# Patient Record
Sex: MALE | Race: ASIAN | NOT HISPANIC OR LATINO | Employment: OTHER | URBAN - METROPOLITAN AREA
[De-identification: names, ages, dates, MRNs, and addresses within clinical notes are randomized per-mention and may not be internally consistent; named-entity substitution may affect disease eponyms.]

---

## 2017-05-15 ENCOUNTER — OFFICE VISIT (OUTPATIENT)
Dept: FAMILY MEDICINE | Facility: CLINIC | Age: 77
End: 2017-05-15
Payer: MEDICARE

## 2017-05-15 VITALS
OXYGEN SATURATION: 96 % | HEIGHT: 67 IN | WEIGHT: 95.88 LBS | SYSTOLIC BLOOD PRESSURE: 110 MMHG | BODY MASS INDEX: 15.05 KG/M2 | HEART RATE: 88 BPM | RESPIRATION RATE: 18 BRPM | DIASTOLIC BLOOD PRESSURE: 64 MMHG

## 2017-05-15 DIAGNOSIS — J47.9 ADULT BRONCHIECTASIS: Primary | ICD-10-CM

## 2017-05-15 DIAGNOSIS — E78.2 ELEVATED TRIGLYCERIDES WITH HIGH CHOLESTEROL: ICD-10-CM

## 2017-05-15 DIAGNOSIS — E03.9 HYPOTHYROIDISM, UNSPECIFIED TYPE: ICD-10-CM

## 2017-05-15 DIAGNOSIS — R63.4 WEIGHT LOSS, NON-INTENTIONAL: ICD-10-CM

## 2017-05-15 PROCEDURE — 1126F AMNT PAIN NOTED NONE PRSNT: CPT | Mod: S$GLB,,, | Performed by: FAMILY MEDICINE

## 2017-05-15 PROCEDURE — 99214 OFFICE O/P EST MOD 30 MIN: CPT | Mod: S$GLB,,, | Performed by: FAMILY MEDICINE

## 2017-05-15 PROCEDURE — 99999 PR PBB SHADOW E&M-EST. PATIENT-LVL III: CPT | Mod: PBBFAC,,, | Performed by: FAMILY MEDICINE

## 2017-05-15 PROCEDURE — 99499 UNLISTED E&M SERVICE: CPT | Mod: S$GLB,,, | Performed by: FAMILY MEDICINE

## 2017-05-15 PROCEDURE — 1159F MED LIST DOCD IN RCRD: CPT | Mod: S$GLB,,, | Performed by: FAMILY MEDICINE

## 2017-05-15 PROCEDURE — 1160F RVW MEDS BY RX/DR IN RCRD: CPT | Mod: S$GLB,,, | Performed by: FAMILY MEDICINE

## 2017-05-15 NOTE — MR AVS SNAPSHOT
[facility]  [address] [facility] [address]  [place] [address]  Phone: [phone]  Fax: [phone]                  Leonarda Almazan   5/15/2017 3:20 PM   Office Visit    Description:  Male : 1940   Provider:  Heriberto Morris MD   Department:  [facility]           Reason for Visit     Follow-up           Diagnoses this Visit        Comments    Adult bronchiectasis    -  Primary     Weight loss, non-intentional         Hypothyroidism, unspecified type         Elevated triglycerides with high cholesterol                To Do List           Future Appointments        Provider Department Dept Phone    2017 8:45 AM LAB, [place] [facility] [phone]    2017 2:00 PM Danilo Summers MD [facility] [phone]    6/15/2017 10:00 AM LAB, [place] [facility] [phone]      Goals (5 Years of Data)     None      [facility]     [facility] Nurse Care Line -  Assistance  Unless otherwise directed by your provider, please contact [facility], our nurse care line that is available for  assistance.     Registered nurses in the [facility] provide: appointment scheduling, clinical advisement, health education, and other advisory services.  Call: [phone] (toll free)               Medications           Message regarding Medications     Verify the changes and/or additions to your medication regime listed below are the same as discussed with your clinician today.  If any of these changes or additions are incorrect, please notify your healthcare provider.             Verify that the below list of medications is an accurate representation of the medications you are currently taking.  If none reported, the list may be blank. If incorrect, please contact your healthcare provider. Carry this list with you in case of emergency.           Current Medications     aluminum  "hydrox-magnesium carb (GAVISCON)  mg/15 mL Susp Take 30 mLs by mouth every evening. 2 tablespoons at bedtime.    levothyroxine (SYNTHROID) 75 MCG tablet Take 1 tablet (75 mcg total) by mouth before breakfast.    mirtazapine (REMERON) 15 MG tablet Take 1 tablet (15 mg total) by mouth every evening.    ranitidine (ZANTAC) 150 MG tablet Take 1 tablet (150 mg total) by mouth 2 (two) times daily.           Clinical Reference Information           Your Vitals Were     BP Pulse Resp Height Weight SpO2    110/64 (BP Location: Left arm, Patient Position: Sitting, BP Method: Manual) 88 18 5' 7" (1.702 m) 43.5 kg (95 lb 14.4 oz) 96%    BMI                15.02 kg/m2          Blood Pressure          Most Recent Value    BP  110/64      Allergies as of 5/15/2017     No Known Allergies      Immunizations Administered on Date of Encounter - 5/15/2017     None      Orders Placed During Today's Visit      Normal Orders This Visit    Ambulatory referral to Pulmonology     Future Labs/Procedures Expected by Expires    CBC auto differential  5/15/2017 5/16/2018    Comprehensive metabolic panel  5/15/2017 5/16/2018    Lipid panel  5/15/2017 5/16/2018    PREALBUMIN  5/15/2017 7/14/2018    TSH  5/15/2017 5/16/2018      Language Assistance Services     ATTENTION: Language assistance services are available, free of charge. Please call [phone].      ATENCIÓN: Si habla español, tiene a spencer disposición servicios gratuitos de asistencia lingüística. Llame al [phone].     CHÚ Ý: N?u b?n nói Ti?ng Vi?t, có các d?ch v? h? tr? ngôn ng? mi?n phí dành cho b?n. G?i s? [phone].         [facility] complies with applicable Federal civil rights laws and does not discriminate on the basis of race, color, national origin, age, disability, or sex.        "

## 2017-05-15 NOTE — PROGRESS NOTES
"Subjective:     THIS DOCUMENT WAS MADE IN PART WITH [employer] DICTATION SOFTWARE. OCCASIONALLY THIS SOFTWARE MAY MISINTERPRET WORDS OR PHRASES.     Patient ID: Leonarda Almzaan is a 76 y.o. male.    Chief Complaint: Follow-up (underweight/fatigue)    HPI    this is a 76-year-old male, Retired , with a history of bronchiectasis, chronic low body mass, with recent weight loss.     Today he is concerned about a few things. He's been very tired, decreased energy. He denies depression. He is sleeping well. He denies any shortness of breath and denies any chest pain.     He is concerned about his weight and recent weight loss. He feels his appetite is good and is eating at least three meals a day. He thinks this is probably multifactorial and probably related to his lung disease.     He has chronic lung disease, chronic abnormal chest imaging. With bronchiectasis. Currently following with Dr. Valentin,  But states he is concerned that no treatment has been offered and states that he keeps being told to repeat a CT scan every six months. The note his last CT scan was more than a year ago.    He does have chronic mucus production, sometimes "black" never read not yellow or green. He has a chronic cough, four years it had been dry but now is sometimes productive as stated     hypothyroidism, chronic condition, this has been stable on Synthroid 75 MCG daily.     He has had some difficulty with insomnia and I placed him on mirtazapine but this was just as much for an appetite stimulant if not more so. But he states this really didn't help him sleep but made him feel groggy the following morning so he has not been taking it. and I stated he doesn't think there is a current problem with his appetite]    Active Ambulatory Problems     Diagnosis Date Noted    Chronic pain 04/11/2012    GERD (gastroesophageal reflux disease)     Lung nodules     COPD (chronic obstructive pulmonary disease)     Anemia 02/19/2013    " "Hypothyroidism 09/30/2013    Bronchiectasis 12/07/2014    Chronic airway obstruction, not elsewhere classified 08/02/2011    Obstructive chronic bronchitis with acute bronchitis 12/26/2014    Dermatophytosis of nail 03/21/2010    Herpes zoster without mention of complication 08/02/2011    Orthostatic hypotension 10/26/2010    Herpes zoster with other specified complications 11/14/2011    Malnutrition of moderate degree 12/26/2014    Pancytopenia 12/26/2014    Tricuspid valve disorders, specified as nonrheumatic 10/14/2010    Cardiac dysrhythmia, unspecified 10/14/2010    Pterygium, unspecified 12/28/2009    Vitreous degeneration 12/28/2009    Pterygium of eye 05/13/2015    Weight loss 05/28/2015     Resolved Ambulatory Problems     Diagnosis Date Noted    No Resolved Ambulatory Problems     Past Medical History:   Diagnosis Date    Anemia 2/19/2013    Bronchiectasis without acute exacerbation     Colon polyp     Cough     Elevated PSA     GERD (gastroesophageal reflux disease)     Herpes zoster w/ nervous system complication 2012    Hypothyroidism     Lung nodules     Post herpetic neuralgia          Review of Systems   Constitutional: Positive for fatigue and unexpected weight change. Negative for appetite change, chills and fever.   HENT: Positive for congestion.    Respiratory: Positive for cough. Negative for shortness of breath.    Cardiovascular: Negative for chest pain.   Gastrointestinal: Negative for abdominal pain, blood in stool, constipation, diarrhea and nausea.   Endocrine: Negative for polydipsia and polyuria.   Genitourinary: Negative for dysuria and hematuria.   Musculoskeletal: Negative.        Objective:       Vitals:    05/15/17 1516   BP: 110/64   BP Location: Left arm   Patient Position: Sitting   BP Method: Manual   Pulse: 88   Resp: 18   SpO2: 96%   Weight: 43.5 kg (95 lb 14.4 oz)   Height: 5' 7" (1.702 m)       Physical Exam   Constitutional: He is oriented to " person, place, and time. He appears well-developed and well-nourished. No distress.   HENT:   Head: Normocephalic and atraumatic.   Eyes: No scleral icterus.   Neck: Normal range of motion. Neck supple.   Cardiovascular: Normal rate, regular rhythm and normal heart sounds.  Exam reveals no gallop.    No murmur heard.  Pulmonary/Chest: Effort normal. No respiratory distress.   Mildly diminished breath sounds bilaterally  Scattered coarse breath sounds   Neurological: He is alert and oriented to person, place, and time.   Skin: Skin is warm and dry. He is not diaphoretic.   Psychiatric: He has a normal mood and affect. His behavior is normal.   Vitals reviewed.      Assessment:       1. Adult bronchiectasis    2. Weight loss, non-intentional    3. Hypothyroidism, unspecified type    4. Elevated triglycerides with high cholesterol        Plan:       Leonarda was seen today for follow-up.    Diagnoses and all orders for this visit:    Adult bronchiectasis  -     Ambulatory referral to Pulmonology   he like a second opinion so will request consultation at [facility].   Though I did discuss the condition with him and advised him that really isn't any specific regular treatment for bronchiectasis. We should monitor and watch for infection. If he has reactive airway symptoms or shortness of breath and then we could consider an anticholinergic or even an inhaled corticosteroid but he's not having the symptoms now. But he also has other maladies on the CT scan that require regular monitoring and surveillance by pulmonology.    Weight loss, non-intentional  -     Comprehensive metabolic panel; Future  -     TSH; Future  -     Lipid panel; Future  -     CBC auto differential; Future  -     PREALBUMIN; Future   I did advise him that even though he feels his appetite is normal, he needs to increase calorie and the protein intake, at minimum 70 g protein daily for maintenance but may need to increase this by 25 - 35% initially  to catch up.    Hypothyroidism, unspecified type  -     TSH; Future    Elevated triglycerides with high cholesterol  -     Lipid panel; Future

## 2017-05-16 ENCOUNTER — LAB VISIT (OUTPATIENT)
Dept: LAB | Facility: HOSPITAL | Age: 77
End: 2017-05-16
Attending: FAMILY MEDICINE
Payer: MEDICARE

## 2017-05-16 DIAGNOSIS — E03.9 HYPOTHYROIDISM, UNSPECIFIED TYPE: ICD-10-CM

## 2017-05-16 DIAGNOSIS — E78.2 ELEVATED TRIGLYCERIDES WITH HIGH CHOLESTEROL: ICD-10-CM

## 2017-05-16 DIAGNOSIS — R63.4 WEIGHT LOSS, NON-INTENTIONAL: ICD-10-CM

## 2017-05-16 LAB
ALBUMIN SERPL BCP-MCNC: 3.2 G/DL
ALP SERPL-CCNC: 93 U/L
ALT SERPL W/O P-5'-P-CCNC: 11 U/L
ANION GAP SERPL CALC-SCNC: 7 MMOL/L
AST SERPL-CCNC: 19 U/L
BASOPHILS # BLD AUTO: 0.02 K/UL
BASOPHILS NFR BLD: 0.3 %
BILIRUB SERPL-MCNC: 0.6 MG/DL
BUN SERPL-MCNC: 21 MG/DL
CALCIUM SERPL-MCNC: 9.3 MG/DL
CHLORIDE SERPL-SCNC: 102 MMOL/L
CHOLEST/HDLC SERPL: 3.8 {RATIO}
CO2 SERPL-SCNC: 29 MMOL/L
CREAT SERPL-MCNC: 0.8 MG/DL
DIFFERENTIAL METHOD: ABNORMAL
EOSINOPHIL # BLD AUTO: 0.2 K/UL
EOSINOPHIL NFR BLD: 3.1 %
ERYTHROCYTE [DISTWIDTH] IN BLOOD BY AUTOMATED COUNT: 14 %
EST. GFR  (AFRICAN AMERICAN): >60 ML/MIN/1.73 M^2
EST. GFR  (NON AFRICAN AMERICAN): >60 ML/MIN/1.73 M^2
GLUCOSE SERPL-MCNC: 90 MG/DL
HCT VFR BLD AUTO: 35.3 %
HDL/CHOLESTEROL RATIO: 26.3 %
HDLC SERPL-MCNC: 175 MG/DL
HDLC SERPL-MCNC: 46 MG/DL
HGB BLD-MCNC: 11.4 G/DL
LDLC SERPL CALC-MCNC: 112.6 MG/DL
LYMPHOCYTES # BLD AUTO: 1.7 K/UL
LYMPHOCYTES NFR BLD: 22.4 %
MCH RBC QN AUTO: 30.6 PG
MCHC RBC AUTO-ENTMCNC: 32.3 %
MCV RBC AUTO: 95 FL
MONOCYTES # BLD AUTO: 0.5 K/UL
MONOCYTES NFR BLD: 6.9 %
NEUTROPHILS # BLD AUTO: 4.9 K/UL
NEUTROPHILS NFR BLD: 67.2 %
NONHDLC SERPL-MCNC: 129 MG/DL
PLATELET # BLD AUTO: 342 K/UL
PMV BLD AUTO: 9.2 FL
POTASSIUM SERPL-SCNC: 4.5 MMOL/L
PREALB SERPL-MCNC: 14 MG/DL
PROT SERPL-MCNC: 8 G/DL
RBC # BLD AUTO: 3.73 M/UL
SODIUM SERPL-SCNC: 138 MMOL/L
TRIGL SERPL-MCNC: 82 MG/DL
TSH SERPL DL<=0.005 MIU/L-ACNC: 3.31 UIU/ML
WBC # BLD AUTO: 7.36 K/UL

## 2017-05-16 PROCEDURE — 84443 ASSAY THYROID STIM HORMONE: CPT

## 2017-05-16 PROCEDURE — 80053 COMPREHEN METABOLIC PANEL: CPT

## 2017-05-16 PROCEDURE — 85025 COMPLETE CBC W/AUTO DIFF WBC: CPT

## 2017-05-16 PROCEDURE — 36415 COLL VENOUS BLD VENIPUNCTURE: CPT | Mod: PO

## 2017-05-16 PROCEDURE — 84134 ASSAY OF PREALBUMIN: CPT

## 2017-05-16 PROCEDURE — 80061 LIPID PANEL: CPT

## 2017-06-07 ENCOUNTER — HOSPITAL ENCOUNTER (OUTPATIENT)
Dept: PULMONOLOGY | Facility: CLINIC | Age: 77
Discharge: HOME | End: 2017-06-07
Payer: MEDICARE

## 2017-06-07 ENCOUNTER — OFFICE VISIT (OUTPATIENT)
Dept: PULMONOLOGY | Facility: CLINIC | Age: 77
End: 2017-06-07
Payer: MEDICARE

## 2017-06-07 VITALS
WEIGHT: 95 LBS | OXYGEN SATURATION: 96 % | HEART RATE: 85 BPM | SYSTOLIC BLOOD PRESSURE: 98 MMHG | DIASTOLIC BLOOD PRESSURE: 66 MMHG | BODY MASS INDEX: 14.4 KG/M2 | HEIGHT: 68 IN

## 2017-06-07 DIAGNOSIS — J44.9 CHRONIC OBSTRUCTIVE PULMONARY DISEASE, UNSPECIFIED COPD TYPE: ICD-10-CM

## 2017-06-07 DIAGNOSIS — A31.9 ATYPICAL MYCOBACTERIAL DISEASE: ICD-10-CM

## 2017-06-07 DIAGNOSIS — A31.0 MAI (MYCOBACTERIUM AVIUM-INTRACELLULARE): Primary | ICD-10-CM

## 2017-06-07 DIAGNOSIS — J47.9 BRONCHIECTASIS, NON-TUBERCULOUS: Primary | ICD-10-CM

## 2017-06-07 DIAGNOSIS — J44.9 CHRONIC OBSTRUCTIVE PULMONARY DISEASE, UNSPECIFIED COPD TYPE: Primary | ICD-10-CM

## 2017-06-07 LAB
PRE FEV1 FVC: 94
PRE FEV1: 2.28
PRE FVC: 2.42
PREDICTED FEV1 FVC: 78
PREDICTED FEV1: 2.71
PREDICTED FVC: 3.46

## 2017-06-07 PROCEDURE — 99204 OFFICE O/P NEW MOD 45 MIN: CPT | Mod: S$GLB,,, | Performed by: INTERNAL MEDICINE

## 2017-06-07 PROCEDURE — 99999 PR PBB SHADOW E&M-EST. PATIENT-LVL III: CPT | Mod: PBBFAC,,, | Performed by: INTERNAL MEDICINE

## 2017-06-07 PROCEDURE — 1159F MED LIST DOCD IN RCRD: CPT | Mod: S$GLB,,, | Performed by: INTERNAL MEDICINE

## 2017-06-07 PROCEDURE — 1126F AMNT PAIN NOTED NONE PRSNT: CPT | Mod: S$GLB,,, | Performed by: INTERNAL MEDICINE

## 2017-06-07 PROCEDURE — 99499 UNLISTED E&M SERVICE: CPT | Mod: S$GLB,,, | Performed by: INTERNAL MEDICINE

## 2017-06-07 PROCEDURE — 94010 BREATHING CAPACITY TEST: CPT | Mod: S$GLB,,, | Performed by: INTERNAL MEDICINE

## 2017-06-07 NOTE — PROGRESS NOTES
Subjective:       Patient ID: Leonarda Almazan is a 76 y.o. male.    Chief Complaint: Abnormal Chest X-ray and Bronchiectasis    HPI  77 yo retired  comes for assessment of an abnormal CT and hx of bronchiectasis. Originally from [place], and emigrated to the [place] in [place]. Attended college in [employer] [facility] and worked at an  there for 20 years and later [employer] in [place]. He is extremely thin, looks like a POW or a TB patient from a sanatorium before antibiotics. He is a non smoker. Tires easily and has infrequent bouts of bronchitis. No hx of hemoptysis or nigh sweats.  I reviewed a series of 4 CT and he has bilateral tree and  Bud changes with bronchiectasis which has slowly progressed from 2014. No significant  Cavitary lesions. Labs done last month are normal except for a slightly low H&H 11.7 and 35.7  Slightly low albumin. He  Had two major abdominal surgeries can't gain weight.  Review of Systems   Constitutional: Positive for fatigue and weakness.   HENT: Negative.    Eyes: Negative.    Respiratory: Negative.         Abnormal CT   Cardiovascular: Negative.    Genitourinary: Negative.    Musculoskeletal: Negative.    Skin: Negative.    Gastrointestinal: Negative.         Two major abdominal surgeries. Appendical abscess   Psychiatric/Behavioral: Negative.        Objective:      Physical Exam   Constitutional: He is oriented to person, place, and time. He appears well-developed and well-nourished.   Truly cachectic Weighs 95#'s   HENT:   Head: Normocephalic and atraumatic.   Right Ear: External ear normal.   Left Ear: External ear normal.   Eyes: Conjunctivae and EOM are normal. Pupils are equal, round, and reactive to light.   Neck: Normal range of motion. Neck supple.   Cardiovascular: Normal rate, regular rhythm and normal heart sounds.    Pulmonary/Chest: Effort normal and breath sounds normal.   Clear without wheezes  Resting Sa02; 93% and  It gerri to 96% when he walked.     FVC down slightly:  2.422.28lites ;oters 69% and FEV-1; Normal 98%   Abdominal: Soft. Bowel sounds are normal.    Schapoid with surgery scars.   Musculoskeletal: Normal range of motion.   Neurological: He is alert and oriented to person, place, and time. He has normal reflexes.   Skin: Skin is warm and dry.   Psychiatric: He has a normal mood and affect. His behavior is normal. Judgment and thought content normal.         Assessment:       No diagnosis found.    Outpatient Encounter Prescriptions as of 6/7/2017   Medication Sig Dispense Refill    aluminum hydrox-magnesium carb (GAVISCON)  mg/15 mL Susp Take 30 mLs by mouth every evening. 2 tablespoons at bedtime.  0    levothyroxine (SYNTHROID) 75 MCG tablet Take 1 tablet (75 mcg total) by mouth before breakfast. 90 tablet 3    mirtazapine (REMERON) 15 MG tablet Take 1 tablet (15 mg total) by mouth every evening. 90 tablet 1    ranitidine (ZANTAC) 150 MG tablet Take 1 tablet (150 mg total) by mouth 2 (two) times daily. 180 tablet 3     No facility-administered encounter medications on file as of 6/7/2017.      No orders of the defined types were placed in this encounter.    Plan:             IMP Chronic atypical mycobacterial infection,,very indolent but progressive, since CT of 2014. Labs Normal.  Getting ESR and will have a CT done in [facility] and review and contact the patient. I doubt that medical therapy is warranted. He doesn't eat now, if he takes 2 or three drugs he will starve to death. CT done in [place] I have reviewed and it shows progressive disease.  Will discuss with the patient about the rational or two drug therapy.  I was on vacation when CT done and have now reviewed the CT and will call patient.

## 2017-06-14 ENCOUNTER — HOSPITAL ENCOUNTER (OUTPATIENT)
Dept: RADIOLOGY | Facility: HOSPITAL | Age: 77
Discharge: HOME | End: 2017-06-14
Attending: INTERNAL MEDICINE
Payer: MEDICARE

## 2017-06-14 DIAGNOSIS — A31.0 MAI (MYCOBACTERIUM AVIUM-INTRACELLULARE): ICD-10-CM

## 2017-06-14 PROCEDURE — 71250 CT THORAX DX C-: CPT | Mod: TC,PO

## 2017-06-14 PROCEDURE — 71250 CT THORAX DX C-: CPT | Mod: 26,,, | Performed by: RADIOLOGY

## 2017-08-08 ENCOUNTER — PATIENT MESSAGE (OUTPATIENT)
Dept: PULMONOLOGY | Facility: CLINIC | Age: 77
End: 2017-08-08

## 2017-08-14 ENCOUNTER — OFFICE VISIT (OUTPATIENT)
Dept: FAMILY MEDICINE | Facility: CLINIC | Age: 77
End: 2017-08-14
Payer: MEDICARE

## 2017-08-14 ENCOUNTER — PATIENT MESSAGE (OUTPATIENT)
Dept: PULMONOLOGY | Facility: CLINIC | Age: 77
End: 2017-08-14

## 2017-08-14 VITALS
BODY MASS INDEX: 15.42 KG/M2 | WEIGHT: 101.44 LBS | HEART RATE: 76 BPM | SYSTOLIC BLOOD PRESSURE: 92 MMHG | DIASTOLIC BLOOD PRESSURE: 57 MMHG

## 2017-08-14 DIAGNOSIS — R63.4 WEIGHT LOSS: ICD-10-CM

## 2017-08-14 DIAGNOSIS — E03.9 HYPOTHYROIDISM, UNSPECIFIED TYPE: ICD-10-CM

## 2017-08-14 DIAGNOSIS — E44.0 MALNUTRITION OF MODERATE DEGREE: ICD-10-CM

## 2017-08-14 DIAGNOSIS — J44.9 CHRONIC OBSTRUCTIVE PULMONARY DISEASE, UNSPECIFIED COPD TYPE: ICD-10-CM

## 2017-08-14 DIAGNOSIS — I95.1 ORTHOSTATIC HYPOTENSION: ICD-10-CM

## 2017-08-14 DIAGNOSIS — Z00.00 ENCOUNTER FOR PREVENTIVE HEALTH EXAMINATION: Primary | ICD-10-CM

## 2017-08-14 DIAGNOSIS — I36.9 TRICUSPID VALVE DISORDERS, SPECIFIED AS NONRHEUMATIC: ICD-10-CM

## 2017-08-14 DIAGNOSIS — I70.0 AORTIC ATHEROSCLEROSIS: ICD-10-CM

## 2017-08-14 DIAGNOSIS — J84.10 PULMONARY FIBROSIS: ICD-10-CM

## 2017-08-14 DIAGNOSIS — A31.0 MYCOBACTERIUM AVIUM INFECTION: ICD-10-CM

## 2017-08-14 DIAGNOSIS — K21.9 GASTROESOPHAGEAL REFLUX DISEASE, ESOPHAGITIS PRESENCE NOT SPECIFIED: ICD-10-CM

## 2017-08-14 DIAGNOSIS — D64.9 ANEMIA, UNSPECIFIED TYPE: ICD-10-CM

## 2017-08-14 DIAGNOSIS — R91.8 LUNG NODULES: ICD-10-CM

## 2017-08-14 DIAGNOSIS — H43.819 VITREOUS DEGENERATION, UNSPECIFIED LATERALITY: ICD-10-CM

## 2017-08-14 DIAGNOSIS — J47.9 BRONCHIECTASIS WITHOUT COMPLICATION: ICD-10-CM

## 2017-08-14 PROCEDURE — 99999 PR PBB SHADOW E&M-EST. PATIENT-LVL III: CPT | Mod: PBBFAC,,, | Performed by: NURSE PRACTITIONER

## 2017-08-14 PROCEDURE — G0439 PPPS, SUBSEQ VISIT: HCPCS | Mod: S$GLB,,, | Performed by: NURSE PRACTITIONER

## 2017-08-14 PROCEDURE — 99499 UNLISTED E&M SERVICE: CPT | Mod: S$GLB,,, | Performed by: NURSE PRACTITIONER

## 2017-08-15 PROBLEM — I70.0 AORTIC ATHEROSCLEROSIS: Status: ACTIVE | Noted: 2017-08-15

## 2017-08-15 PROBLEM — J84.10 PULMONARY FIBROSIS: Status: ACTIVE | Noted: 2017-08-15

## 2017-08-15 NOTE — PROGRESS NOTES
Leonarda Almazan presented for a  Medicare AWV and comprehensive Health Risk Assessment today. The following components were reviewed and updated:    · Medical history  · Family History  · Social history  · Allergies and Current Medications  · Health Risk Assessment  · Health Maintenance  · Care Team     ** See Completed Assessments for Annual Wellness Visit within the encounter summary.**       The following assessments were completed:  · Living Situation  · CAGE  · Depression Screening  · Timed Get Up and Go  · Whisper Test  · Cognitive Function Screening  · Nutrition Screening  · ADL Screening  · PAQ Screening    Vitals:    08/14/17 1514   BP: (!) 92/57   Pulse: 76   Weight: 46 kg (101 lb 6.6 oz)     Body mass index is 15.42 kg/m².  Physical Exam   Constitutional: He is oriented to person, place, and time. He appears well-developed. No distress.   cachetic   HENT:   Head: Normocephalic and atraumatic.   Eyes: No scleral icterus.   Neck: Normal range of motion. Neck supple.   Cardiovascular: Normal rate, regular rhythm and normal heart sounds.  Exam reveals no gallop.    No murmur heard.  Pulmonary/Chest: Effort normal. No respiratory distress. He has decreased breath sounds in the right lower field and the left lower field.   Mildly diminished breath sounds bilaterally   Neurological: He is alert and oriented to person, place, and time.   Skin: Skin is warm and dry. He is not diaphoretic.   Psychiatric: He has a normal mood and affect. His behavior is normal.   Vitals reviewed.        Diagnoses and health risks identified today and associated recommendations/orders:    1. Encounter for preventive health examination  Recommend yearly HRA visit    2. Mycobacterium avium infection    Continue to follow pulm recs  Schedule follow up with pulmonary  Followed by [facility].       3. Malnutrition of moderate degree  Stable.   Maintaining weight  Lengthy discussion regarding increasing food intake  Is willing to add bananas and  peanut butter to diet  Followed by Heriberto Morris MD .       4. Bronchiectasis without complication  Stable.     Followed by [facility].       5. Chronic obstructive pulmonary disease, unspecified COPD type  Unchanged  Followed by Pulmonary.     6.  Pulmonary fibrosis  Stable.     Followed by [facility].    CT chest 6/14/17    7.  Aortic atherosclerosis  Stable.   Continue to monitor lipids  Followed by Heriberto Morris MD .    CT Chest 4/25/16    6. Orthostatic hypotension  Stable.   Continue to drink adequate amount of h20  Followed by Heriberto Morris MD .       7. Anemia, unspecified type    Continue to monitor   Followed by Heriberto Morris MD .       8. Weight loss  maintaining weight  Encourage increase caloric intake, specifically protein  Followed by Heriberto Morris MD .       9. Gastroesophageal reflux disease, esophagitis presence not specified  Stable.   Taking zantac  Followed by Heriberto Morris MD .       10. Lung nodules  Continue surveillance  Followed by [facility].       11. Hypothyroidism, unspecified type  Stable.   Taking levothyroxine, Continue to monitor tsh  Followed by Heriberto Morris MD .       12. Tricuspid valve disorders, specified as nonrheumatic  Stable.     Followed by Heriberto Morris MD .       13. Vitreous degeneration, unspecified laterality  Stable.     Followed by [facility].         Provided Ju with a 5-10 year written screening schedule and personal prevention plan. Recommendations were developed using the USPSTF age appropriate recommendations. Education, counseling, and referrals were provided as needed. After Visit Summary printed and given to patient which includes a list of additional screenings\tests needed.    Return in about 1 year (around 8/14/2018).    Sophie Hurt NP

## 2017-08-18 ENCOUNTER — TELEPHONE (OUTPATIENT)
Dept: PULMONOLOGY | Facility: CLINIC | Age: 77
End: 2017-08-18

## 2017-08-18 DIAGNOSIS — A31.0 PULMONARY MYCOBACTERIUM AVIUM COMPLEX (MAC) INFECTION: Primary | ICD-10-CM

## 2017-08-18 RX ORDER — CIPROFLOXACIN 500 MG/1
500 TABLET ORAL 2 TIMES DAILY
Qty: 60 TABLET | Refills: 6 | Status: SHIPPED | OUTPATIENT
Start: 2017-08-18 | End: 2017-11-22 | Stop reason: SDUPTHER

## 2017-08-18 RX ORDER — AZITHROMYCIN 500 MG/1
TABLET, FILM COATED ORAL
Qty: 15 TABLET | Refills: 6 | Status: SHIPPED | OUTPATIENT
Start: 2017-08-18 | End: 2018-05-03 | Stop reason: ALTCHOICE

## 2017-08-18 NOTE — TELEPHONE ENCOUNTER
Patient has bronchiectasis and has slow progression of MAC on CT. Will give an empirical trial of cipro 500 mg BID and azithormycin 500 my qod. He has had gastric surgery in the past and has a long hx of digestive problems. Will check in weekly to see if he can tolerate the antibiotics.

## 2017-08-27 ENCOUNTER — PATIENT MESSAGE (OUTPATIENT)
Dept: PULMONOLOGY | Facility: CLINIC | Age: 77
End: 2017-08-27

## 2017-09-03 ENCOUNTER — PATIENT MESSAGE (OUTPATIENT)
Dept: PULMONOLOGY | Facility: CLINIC | Age: 77
End: 2017-09-03

## 2017-09-16 ENCOUNTER — PATIENT MESSAGE (OUTPATIENT)
Dept: PULMONOLOGY | Facility: CLINIC | Age: 77
End: 2017-09-16

## 2017-10-01 ENCOUNTER — PATIENT MESSAGE (OUTPATIENT)
Dept: PULMONOLOGY | Facility: CLINIC | Age: 77
End: 2017-10-01

## 2017-10-06 ENCOUNTER — PATIENT MESSAGE (OUTPATIENT)
Dept: PULMONOLOGY | Facility: CLINIC | Age: 77
End: 2017-10-06

## 2017-10-12 ENCOUNTER — HOSPITAL ENCOUNTER (OUTPATIENT)
Dept: RADIOLOGY | Facility: HOSPITAL | Age: 77
Discharge: HOME | End: 2017-10-12
Attending: INTERNAL MEDICINE
Payer: MEDICARE

## 2017-10-12 DIAGNOSIS — J44.9 CHRONIC OBSTRUCTIVE PULMONARY DISEASE, UNSPECIFIED COPD TYPE: ICD-10-CM

## 2017-10-12 PROCEDURE — 71020 XR CHEST PA AND LATERAL: CPT | Mod: 26,,, | Performed by: RADIOLOGY

## 2017-10-12 PROCEDURE — 71020 XR CHEST PA AND LATERAL: CPT | Mod: TC,PO

## 2017-10-15 ENCOUNTER — PATIENT MESSAGE (OUTPATIENT)
Dept: PULMONOLOGY | Facility: CLINIC | Age: 77
End: 2017-10-15

## 2017-10-27 ENCOUNTER — PATIENT MESSAGE (OUTPATIENT)
Dept: PULMONOLOGY | Facility: CLINIC | Age: 77
End: 2017-10-27

## 2017-10-30 DIAGNOSIS — A31.0 MYCOBACTERIUM AVIUM COMPLEX: Primary | ICD-10-CM

## 2017-11-13 ENCOUNTER — PATIENT MESSAGE (OUTPATIENT)
Dept: PULMONOLOGY | Facility: CLINIC | Age: 77
End: 2017-11-13

## 2017-11-20 ENCOUNTER — TELEPHONE (OUTPATIENT)
Dept: FAMILY MEDICINE | Facility: CLINIC | Age: 77
End: 2017-11-20

## 2017-11-20 ENCOUNTER — PATIENT MESSAGE (OUTPATIENT)
Dept: PULMONOLOGY | Facility: CLINIC | Age: 77
End: 2017-11-20

## 2017-11-20 ENCOUNTER — TELEPHONE (OUTPATIENT)
Dept: PULMONOLOGY | Facility: CLINIC | Age: 77
End: 2017-11-20

## 2017-11-20 NOTE — TELEPHONE ENCOUNTER
----- Message from Darnell Jackson sent at 11/20/2017  9:58 AM CST -----  Contact: [facility]- [phone]  Patient returning the nurse phone call. Thanks!

## 2017-11-22 ENCOUNTER — OFFICE VISIT (OUTPATIENT)
Dept: FAMILY MEDICINE | Facility: CLINIC | Age: 77
End: 2017-11-22
Payer: MEDICARE

## 2017-11-22 VITALS
BODY MASS INDEX: 16.17 KG/M2 | HEIGHT: 68 IN | RESPIRATION RATE: 16 BRPM | DIASTOLIC BLOOD PRESSURE: 64 MMHG | HEART RATE: 72 BPM | SYSTOLIC BLOOD PRESSURE: 96 MMHG | WEIGHT: 106.69 LBS

## 2017-11-22 DIAGNOSIS — Z00.00 WELLNESS EXAMINATION: Primary | ICD-10-CM

## 2017-11-22 DIAGNOSIS — K21.9 GASTROESOPHAGEAL REFLUX DISEASE, ESOPHAGITIS PRESENCE NOT SPECIFIED: ICD-10-CM

## 2017-11-22 DIAGNOSIS — A31.0 PULMONARY MYCOBACTERIUM AVIUM COMPLEX (MAC) INFECTION: ICD-10-CM

## 2017-11-22 DIAGNOSIS — Z23 NEED FOR PNEUMOCOCCAL VACCINE: ICD-10-CM

## 2017-11-22 DIAGNOSIS — E03.9 HYPOTHYROIDISM, UNSPECIFIED TYPE: ICD-10-CM

## 2017-11-22 PROCEDURE — G0009 ADMIN PNEUMOCOCCAL VACCINE: HCPCS | Mod: S$GLB,,, | Performed by: FAMILY MEDICINE

## 2017-11-22 PROCEDURE — G0008 ADMIN INFLUENZA VIRUS VAC: HCPCS | Mod: S$GLB,,, | Performed by: FAMILY MEDICINE

## 2017-11-22 PROCEDURE — 99999 PR PBB SHADOW E&M-EST. PATIENT-LVL III: CPT | Mod: PBBFAC,,, | Performed by: FAMILY MEDICINE

## 2017-11-22 PROCEDURE — 90662 IIV NO PRSV INCREASED AG IM: CPT | Mod: S$GLB,,, | Performed by: FAMILY MEDICINE

## 2017-11-22 PROCEDURE — 99499 UNLISTED E&M SERVICE: CPT | Mod: S$GLB,,, | Performed by: FAMILY MEDICINE

## 2017-11-22 PROCEDURE — 90670 PCV13 VACCINE IM: CPT | Mod: S$GLB,,, | Performed by: FAMILY MEDICINE

## 2017-11-22 PROCEDURE — 99397 PER PM REEVAL EST PAT 65+ YR: CPT | Mod: S$GLB,,, | Performed by: FAMILY MEDICINE

## 2017-11-22 RX ORDER — CIPROFLOXACIN 500 MG/1
500 TABLET ORAL 2 TIMES DAILY
Qty: 180 TABLET | Refills: 0 | Status: SHIPPED | OUTPATIENT
Start: 2017-11-22 | End: 2018-05-03 | Stop reason: ALTCHOICE

## 2017-11-22 RX ORDER — MULTIVITAMIN
1 TABLET ORAL DAILY
Status: ON HOLD | COMMUNITY
End: 2022-01-01 | Stop reason: HOSPADM

## 2017-11-22 RX ORDER — LEVOTHYROXINE SODIUM 75 UG/1
75 TABLET ORAL
Qty: 90 TABLET | Refills: 3 | Status: SHIPPED | OUTPATIENT
Start: 2017-11-22 | End: 2019-01-14 | Stop reason: SDUPTHER

## 2017-11-22 NOTE — PROGRESS NOTES
Subjective:       Patient ID: Leonarda Almazan is a 77 y.o. male.    Chief Complaint: Establish Care (Patient here to establish care )    Here to establish care; saw Dr. Morris previously.  States doing well overall.  Due for wellness.  Sees pulmonary still and on month 3/6 of abx for FILI.      Review of Systems   Constitutional: Negative for activity change, chills, fever and unexpected weight change.   HENT: Negative for hearing loss, rhinorrhea and trouble swallowing.    Eyes: Negative for discharge and visual disturbance.   Respiratory: Negative for cough, chest tightness, shortness of breath and wheezing.    Cardiovascular: Negative for chest pain, palpitations and leg swelling.   Gastrointestinal: Negative for blood in stool, constipation, diarrhea and vomiting.   Endocrine: Negative for cold intolerance, heat intolerance, polydipsia and polyuria.   Genitourinary: Negative for difficulty urinating, hematuria and urgency.   Musculoskeletal: Negative for arthralgias, joint swelling and neck pain.   Skin: Negative for rash.   Neurological: Negative for weakness and headaches.   Psychiatric/Behavioral: Negative for confusion and dysphoric mood.       Objective:      Physical Exam   Constitutional: He appears well-developed and well-nourished.   HENT:   Head: Normocephalic and atraumatic.   Cardiovascular: Normal rate, regular rhythm and normal heart sounds.    Pulmonary/Chest: Effort normal and breath sounds normal.   Abdominal: Soft. Bowel sounds are normal.   Psychiatric: He has a normal mood and affect.   Nursing note and vitals reviewed.      Assessment:       1. Wellness examination    2. Hypothyroidism, unspecified type    3. Gastroesophageal reflux disease, esophagitis presence not specified    4. Pulmonary Mycobacterium avium complex (MAC) infection    5. Need for pneumococcal vaccine        Plan:       Wellness examination    Hypothyroidism, unspecified type  -     levothyroxine (SYNTHROID) 75 MCG tablet;  Take 1 tablet (75 mcg total) by mouth before breakfast.  Dispense: 90 tablet; Refill: 3  -     CBC auto differential; Future; Expected date: 11/22/2017  -     Comprehensive metabolic panel; Future; Expected date: 11/22/2017  -     Lipid panel; Future; Expected date: 11/22/2017  -     TSH; Future; Expected date: 11/22/2017    Gastroesophageal reflux disease, esophagitis presence not specified  -     CBC auto differential; Future; Expected date: 11/22/2017  -     Comprehensive metabolic panel; Future; Expected date: 11/22/2017  -     Lipid panel; Future; Expected date: 11/22/2017  -     TSH; Future; Expected date: 11/22/2017    Pulmonary Mycobacterium avium complex (MAC) infection  -     ciprofloxacin HCl (CIPRO) 500 MG tablet; Take 1 tablet (500 mg total) by mouth 2 (two) times daily.  Dispense: 180 tablet; Refill: 0    Need for pneumococcal vaccine  -     (In Office Administered) Pneumococcal Conjugate Vaccine (13 Valent) (IM)      Weight up since last visit so hopefully as FILI is treated he will gain more but will monitor.  Update labs and health maintenance.  Will monitor chronic medical issues and continue current plan of care.    Return in about 6 months (around 5/22/2018), or if symptoms worsen or fail to improve.

## 2017-11-28 ENCOUNTER — HOSPITAL ENCOUNTER (OUTPATIENT)
Dept: RADIOLOGY | Facility: HOSPITAL | Age: 77
Discharge: HOME | End: 2017-11-28
Attending: INTERNAL MEDICINE
Payer: MEDICARE

## 2017-11-28 ENCOUNTER — PATIENT MESSAGE (OUTPATIENT)
Dept: PULMONOLOGY | Facility: CLINIC | Age: 77
End: 2017-11-28

## 2017-11-28 DIAGNOSIS — A31.0 MYCOBACTERIUM AVIUM COMPLEX: ICD-10-CM

## 2017-11-28 PROCEDURE — 71020 XR CHEST PA AND LATERAL: CPT | Mod: 26,,, | Performed by: RADIOLOGY

## 2017-11-28 PROCEDURE — 71020 XR CHEST PA AND LATERAL: CPT | Mod: TC,PO

## 2017-12-09 ENCOUNTER — PATIENT MESSAGE (OUTPATIENT)
Dept: PULMONOLOGY | Facility: CLINIC | Age: 77
End: 2017-12-09

## 2017-12-24 ENCOUNTER — PATIENT MESSAGE (OUTPATIENT)
Dept: PULMONOLOGY | Facility: CLINIC | Age: 77
End: 2017-12-24

## 2018-01-06 ENCOUNTER — PATIENT MESSAGE (OUTPATIENT)
Dept: PULMONOLOGY | Facility: CLINIC | Age: 78
End: 2018-01-06

## 2018-01-21 ENCOUNTER — PATIENT MESSAGE (OUTPATIENT)
Dept: PULMONOLOGY | Facility: CLINIC | Age: 78
End: 2018-01-21

## 2018-02-03 ENCOUNTER — PATIENT MESSAGE (OUTPATIENT)
Dept: PULMONOLOGY | Facility: CLINIC | Age: 78
End: 2018-02-03

## 2018-02-17 ENCOUNTER — PATIENT MESSAGE (OUTPATIENT)
Dept: PULMONOLOGY | Facility: CLINIC | Age: 78
End: 2018-02-17

## 2018-04-10 ENCOUNTER — TELEPHONE (OUTPATIENT)
Dept: FAMILY MEDICINE | Facility: CLINIC | Age: 78
End: 2018-04-10

## 2018-04-11 ENCOUNTER — TELEPHONE (OUTPATIENT)
Dept: FAMILY MEDICINE | Facility: CLINIC | Age: 78
End: 2018-04-11

## 2018-04-11 NOTE — TELEPHONE ENCOUNTER
"Offered [facility] health risk assessment appt for 2018 and pt declined. Mr. Almazan states, "this do not help me . I do not want it".  "

## 2018-04-23 ENCOUNTER — PATIENT MESSAGE (OUTPATIENT)
Dept: PULMONOLOGY | Facility: CLINIC | Age: 78
End: 2018-04-23

## 2018-04-23 ENCOUNTER — TELEPHONE (OUTPATIENT)
Dept: PULMONOLOGY | Facility: CLINIC | Age: 78
End: 2018-04-23

## 2018-04-23 DIAGNOSIS — Q04.5: Primary | ICD-10-CM

## 2018-04-24 ENCOUNTER — HOSPITAL ENCOUNTER (OUTPATIENT)
Dept: RADIOLOGY | Facility: HOSPITAL | Age: 78
Discharge: HOME | End: 2018-04-24
Attending: INTERNAL MEDICINE
Payer: MEDICARE

## 2018-04-24 ENCOUNTER — TELEPHONE (OUTPATIENT)
Dept: FAMILY MEDICINE | Facility: CLINIC | Age: 78
End: 2018-04-24

## 2018-04-24 DIAGNOSIS — D53.9 NUTRITIONAL ANEMIA: Primary | ICD-10-CM

## 2018-04-24 DIAGNOSIS — Q04.5: ICD-10-CM

## 2018-04-24 PROCEDURE — 71046 X-RAY EXAM CHEST 2 VIEWS: CPT | Mod: 26,,, | Performed by: RADIOLOGY

## 2018-04-24 PROCEDURE — 71046 X-RAY EXAM CHEST 2 VIEWS: CPT | Mod: TC,FY,PO

## 2018-04-24 NOTE — TELEPHONE ENCOUNTER
----- Message from Cat Francis sent at 4/24/2018 12:06 PM CDT -----  Type: Needs Medical Advice    Who Called:  self  Symptoms (please be specific):  anemia  How long has patient had these symptoms:  Asking for lab orders Thursday or friday  Pharmacy name and phone  Best Call Back Number: [phone]  Additional Information:

## 2018-04-25 ENCOUNTER — LAB VISIT (OUTPATIENT)
Dept: LAB | Facility: HOSPITAL | Age: 78
End: 2018-04-25
Attending: FAMILY MEDICINE
Payer: MEDICARE

## 2018-04-25 DIAGNOSIS — D53.9 NUTRITIONAL ANEMIA: ICD-10-CM

## 2018-04-25 LAB
FERRITIN SERPL-MCNC: 113 NG/ML
FOLATE SERPL-MCNC: 15.4 NG/ML
IRON SERPL-MCNC: 66 UG/DL
SATURATED IRON: 19 %
TOTAL IRON BINDING CAPACITY: 346 UG/DL
TRANSFERRIN SERPL-MCNC: 234 MG/DL
VIT B12 SERPL-MCNC: 780 PG/ML

## 2018-04-25 PROCEDURE — 83540 ASSAY OF IRON: CPT

## 2018-04-25 PROCEDURE — 82746 ASSAY OF FOLIC ACID SERUM: CPT

## 2018-04-25 PROCEDURE — 82728 ASSAY OF FERRITIN: CPT

## 2018-04-25 PROCEDURE — 82607 VITAMIN B-12: CPT

## 2018-04-25 PROCEDURE — 36415 COLL VENOUS BLD VENIPUNCTURE: CPT | Mod: PO

## 2018-04-28 ENCOUNTER — PATIENT MESSAGE (OUTPATIENT)
Dept: PULMONOLOGY | Facility: CLINIC | Age: 78
End: 2018-04-28

## 2018-05-03 ENCOUNTER — PATIENT MESSAGE (OUTPATIENT)
Dept: FAMILY MEDICINE | Facility: CLINIC | Age: 78
End: 2018-05-03

## 2018-05-03 ENCOUNTER — PATIENT MESSAGE (OUTPATIENT)
Dept: PULMONOLOGY | Facility: CLINIC | Age: 78
End: 2018-05-03

## 2018-05-03 ENCOUNTER — OFFICE VISIT (OUTPATIENT)
Dept: FAMILY MEDICINE | Facility: CLINIC | Age: 78
End: 2018-05-03
Payer: MEDICARE

## 2018-05-03 VITALS
DIASTOLIC BLOOD PRESSURE: 56 MMHG | SYSTOLIC BLOOD PRESSURE: 96 MMHG | HEART RATE: 68 BPM | OXYGEN SATURATION: 95 % | WEIGHT: 110.25 LBS | BODY MASS INDEX: 16.71 KG/M2 | TEMPERATURE: 98 F | HEIGHT: 68 IN

## 2018-05-03 DIAGNOSIS — J44.9 CHRONIC OBSTRUCTIVE PULMONARY DISEASE, UNSPECIFIED COPD TYPE: ICD-10-CM

## 2018-05-03 DIAGNOSIS — Q04.5: Primary | ICD-10-CM

## 2018-05-03 DIAGNOSIS — A31.0 MYCOBACTERIUM AVIUM INFECTION: ICD-10-CM

## 2018-05-03 DIAGNOSIS — I70.0 AORTIC ATHEROSCLEROSIS: ICD-10-CM

## 2018-05-03 DIAGNOSIS — E44.0 MALNUTRITION OF MODERATE DEGREE: Primary | ICD-10-CM

## 2018-05-03 DIAGNOSIS — J84.10 PULMONARY FIBROSIS: ICD-10-CM

## 2018-05-03 DIAGNOSIS — J47.9 BRONCHIECTASIS WITHOUT COMPLICATION: ICD-10-CM

## 2018-05-03 PROCEDURE — 99499 UNLISTED E&M SERVICE: CPT | Mod: S$PBB,,, | Performed by: FAMILY MEDICINE

## 2018-05-03 PROCEDURE — 99214 OFFICE O/P EST MOD 30 MIN: CPT | Mod: S$GLB,,, | Performed by: FAMILY MEDICINE

## 2018-05-03 PROCEDURE — 99999 PR PBB SHADOW E&M-EST. PATIENT-LVL III: CPT | Mod: PBBFAC,,, | Performed by: FAMILY MEDICINE

## 2018-05-03 RX ORDER — ZOSTER VACCINE RECOMBINANT, ADJUVANTED 50 MCG/0.5
KIT INTRAMUSCULAR
COMMUNITY
Start: 2018-04-15 | End: 2018-05-03

## 2018-05-03 NOTE — PROGRESS NOTES
Subjective:       Patient ID: Leonarda Almazan is a 77 y.o. male.    Chief Complaint: Cough; Fatigue; Dizziness; and under weight    Here for f/u chronic medical issues. Finished abx treatment for MAC.  Feels better.  Has gained 4 pounds.  Follows with pulmonary.        Cough   This is a chronic problem. The current episode started more than 1 month ago. The problem has been unchanged. The cough is non-productive. Associated symptoms include rhinorrhea and weight loss. Pertinent negatives include no chills, heartburn, hemoptysis, myalgias, nasal congestion, postnasal drip, rash, sore throat, shortness of breath, sweats or wheezing. The symptoms are aggravated by cold air. Risk factors for lung disease include occupational exposure and smoking/tobacco exposure. He has tried nothing for the symptoms. The treatment provided no relief. His past medical history is significant for bronchiectasis, COPD and pneumonia.   Fatigue   Associated symptoms include coughing and fatigue. Pertinent negatives include no chills, myalgias, rash or sore throat.     Review of Systems   Constitutional: Positive for fatigue and weight loss. Negative for chills.   HENT: Positive for rhinorrhea. Negative for postnasal drip and sore throat.    Respiratory: Positive for cough. Negative for hemoptysis, shortness of breath and wheezing.    Gastrointestinal: Negative for heartburn.   Musculoskeletal: Negative for myalgias.   Skin: Negative for rash.       Objective:      Physical Exam   Constitutional: He appears well-developed and well-nourished.   HENT:   Head: Normocephalic and atraumatic.   Cardiovascular: Normal rate, regular rhythm and normal heart sounds.    Pulmonary/Chest: Effort normal and breath sounds normal.   Psychiatric: He has a normal mood and affect.   Nursing note and vitals reviewed.      Assessment:       1. Malnutrition of moderate degree    2. Chronic obstructive pulmonary disease, unspecified COPD type    3. Pulmonary fibrosis     4. Bronchiectasis without complication    5. Mycobacterium avium infection    6. Aortic atherosclerosis        Plan:       Malnutrition of moderate degree  -     CBC auto differential; Future; Expected date: 05/03/2018  -     Comprehensive metabolic panel; Future; Expected date: 05/03/2018  -     Lipid panel; Future; Expected date: 05/03/2018  -     TSH; Future; Expected date: 05/03/2018    Chronic obstructive pulmonary disease, unspecified COPD type    Pulmonary fibrosis    Bronchiectasis without complication    Mycobacterium avium infection  -     CBC auto differential; Future; Expected date: 05/03/2018  -     Comprehensive metabolic panel; Future; Expected date: 05/03/2018  -     Lipid panel; Future; Expected date: 05/03/2018  -     TSH; Future; Expected date: 05/03/2018    Aortic atherosclerosis  -     CBC auto differential; Future; Expected date: 05/03/2018  -     Comprehensive metabolic panel; Future; Expected date: 05/03/2018  -     Lipid panel; Future; Expected date: 05/03/2018  -     TSH; Future; Expected date: 05/03/2018    f/u with pulmonary as planned to discuss possible continuing abx; inhaler?  Will monitor chronic medical issues and continue current plan of care.    Follow-up in about 3 months (around 8/3/2018), or if symptoms worsen or fail to improve.

## 2018-05-04 ENCOUNTER — PATIENT MESSAGE (OUTPATIENT)
Dept: FAMILY MEDICINE | Facility: CLINIC | Age: 78
End: 2018-05-04

## 2018-05-07 ENCOUNTER — PATIENT MESSAGE (OUTPATIENT)
Dept: FAMILY MEDICINE | Facility: CLINIC | Age: 78
End: 2018-05-07

## 2018-05-14 ENCOUNTER — PATIENT MESSAGE (OUTPATIENT)
Dept: PULMONOLOGY | Facility: CLINIC | Age: 78
End: 2018-05-14

## 2018-05-16 DIAGNOSIS — A31.0 MAI (MYCOBACTERIUM AVIUM-INTRACELLULARE): Primary | ICD-10-CM

## 2018-05-16 RX ORDER — CIPROFLOXACIN 500 MG/1
500 TABLET ORAL 2 TIMES DAILY
Qty: 60 TABLET | Refills: 12 | Status: SHIPPED | OUTPATIENT
Start: 2018-05-16 | End: 2018-09-04 | Stop reason: ALTCHOICE

## 2018-05-16 RX ORDER — AZITHROMYCIN 250 MG/1
250 TABLET, FILM COATED ORAL DAILY
Qty: 30 TABLET | Refills: 12 | Status: SHIPPED | OUTPATIENT
Start: 2018-05-16 | End: 2018-05-22

## 2018-07-23 ENCOUNTER — OFFICE VISIT (OUTPATIENT)
Dept: CARDIOLOGY | Facility: CLINIC | Age: 78
End: 2018-07-23
Payer: MEDICARE

## 2018-07-23 VITALS
WEIGHT: 103.19 LBS | SYSTOLIC BLOOD PRESSURE: 93 MMHG | BODY MASS INDEX: 15.64 KG/M2 | HEART RATE: 81 BPM | HEIGHT: 68 IN | DIASTOLIC BLOOD PRESSURE: 52 MMHG

## 2018-07-23 DIAGNOSIS — R55 NEAR SYNCOPE: ICD-10-CM

## 2018-07-23 DIAGNOSIS — R94.31 NONSPECIFIC ABNORMAL ELECTROCARDIOGRAM (ECG) (EKG): ICD-10-CM

## 2018-07-23 DIAGNOSIS — R07.89 CHEST HEAVINESS: Primary | ICD-10-CM

## 2018-07-23 DIAGNOSIS — I95.1 ORTHOSTATIC HYPOTENSION: ICD-10-CM

## 2018-07-23 DIAGNOSIS — R53.83 FATIGUE, UNSPECIFIED TYPE: ICD-10-CM

## 2018-07-23 DIAGNOSIS — R01.1 UNDIAGNOSED CARDIAC MURMURS: ICD-10-CM

## 2018-07-23 PROCEDURE — 99203 OFFICE O/P NEW LOW 30 MIN: CPT | Mod: S$GLB,,, | Performed by: INTERNAL MEDICINE

## 2018-07-23 PROCEDURE — 99999 PR PBB SHADOW E&M-EST. PATIENT-LVL III: CPT | Mod: PBBFAC,,, | Performed by: INTERNAL MEDICINE

## 2018-07-23 PROCEDURE — 93000 ELECTROCARDIOGRAM COMPLETE: CPT | Mod: S$GLB,,, | Performed by: INTERNAL MEDICINE

## 2018-07-23 RX ORDER — FLUDROCORTISONE ACETATE 0.1 MG/1
100 TABLET ORAL DAILY
Qty: 30 TABLET | Refills: 1 | Status: SHIPPED | OUTPATIENT
Start: 2018-07-23 | End: 2018-07-23 | Stop reason: SDUPTHER

## 2018-07-23 RX ORDER — FLUDROCORTISONE ACETATE 0.1 MG/1
100 TABLET ORAL DAILY
Qty: 90 TABLET | Refills: 0 | Status: SHIPPED | OUTPATIENT
Start: 2018-07-23 | End: 2018-08-22

## 2018-07-23 NOTE — PROGRESS NOTES
Subjective:    Patient ID:  Leonarda Almazan is a 78 y.o. male who presents for Fatigue (heaviness in chest for past year) and Chest Pain        Fatigue   This is a chronic problem. The current episode started more than 1 year ago. The problem occurs daily. The problem has been gradually worsening. Associated symptoms include coughing and fatigue. Pertinent negatives include no abdominal pain, change in bowel habit, chest pain (MILD HEAVINESS IN CHEST), chills, congestion, diaphoresis, fever, numbness, rash, vomiting or weakness.   Chest Pain    This is a recurrent problem. The problem occurs intermittently. The problem has been waxing and waning. The pain is present in the lateral region. The pain is at a severity of 2/10. The quality of the pain is described as heavy. Associated symptoms include a cough, malaise/fatigue, near-syncope (WITH LOW BP, IN THE SHOWER/HOT) and sputum production. Pertinent negatives include no abdominal pain, back pain, claudication, diaphoresis, dizziness (MILD ORTHOSTASIS), fever, hemoptysis, irregular heartbeat (OCC), numbness, orthopnea, palpitations, PND, shortness of breath, syncope, vomiting or weakness.     New patient evaluation, FATIGUE, ON ABX FOR > SX FOR  FILI, , WAS BETTER WHEN ON ABX, NOW MORE COUGH, ALSO REPORTS FATIGUE, LAST , CHEST DISCOMFORT BETTER WITH EXERCISE, WORSE WHEN DOES NOT EAT WELL, SEE ROS    Past Medical History:   Diagnosis Date    Anemia 2/19/2013    Bronchiectasis without acute exacerbation     Colon polyp     Cough     Elevated PSA     GERD (gastroesophageal reflux disease)     on herbal med    Herpes zoster w/ nervous system complication 2012    still has some pain, takes no meds    Hypothyroidism     Lung nodules     followed by Dr. Montoya    Post herpetic neuralgia      Past Surgical History:   Procedure Laterality Date    APPENDECTOMY      COLONOSCOPY  9/15/2008  [facility]    Two 1 mm polyps in the ascending colon.  HYPERPLASTIC POLYPS.       ESOPHAGOGASTRODUODENOSCOPY  10/16/2012  [facility]    Non-erosive esophageal reflux (NERD) disease???.   Gastric mucosal atrophy.  MILD CHRONIC GASTRITIS WITH INTESTINAL METAPLASIA.  Otherwise normal stomach and duodenum.  CLOtest negative.    EYE SURGERY      OS, not cataract, pt unsure of name    UPPER GASTROINTESTINAL ENDOSCOPY  9/15/2008  [facility]    Normal esophagus.   Symptoms probably due to NERD.  Slight gastric mucosal atrophy.   Normal examined duodenum.     Family History   Problem Relation Age of Onset    Diabetes Neg Hx     Glaucoma Neg Hx     Macular degeneration Neg Hx     Hypertension Neg Hx     Retinal detachment Neg Hx     Strabismus Neg Hx     Stroke Neg Hx     Thyroid disease Neg Hx     Cataracts Neg Hx     Cancer Neg Hx     Blindness Neg Hx     Amblyopia Neg Hx      Social History     Social History    Marital status:      Spouse name: N/A    Number of children: N/A    Years of education: N/A     Social History Main Topics    Smoking status: Never Smoker    Smokeless tobacco: Never Used    Alcohol use No    Drug use: No    Sexual activity: No     Other Topics Concern    None     Social History Narrative    None       Review of patient's allergies indicates:  No Known Allergies    Current Outpatient Prescriptions:     levothyroxine (SYNTHROID) 75 MCG tablet, Take 1 tablet (75 mcg total) by mouth before breakfast., Disp: 90 tablet, Rfl: 3    multivitamin (ONE DAILY MULTIVITAMIN) per tablet, Take 1 tablet by mouth once daily., Disp: , Rfl:     ranitidine (ZANTAC) 150 MG capsule, Take 150 mg by mouth nightly., Disp: , Rfl:     aluminum hydrox-magnesium carb (GAVISCON)  mg/15 mL Susp, Take 30 mLs by mouth every evening. 2 tablespoons at bedtime., Disp: , Rfl: 0    ciprofloxacin HCl (CIPRO) 500 MG tablet, Take 1 tablet (500 mg total) by mouth 2 (two) times daily., Disp: 60 tablet, Rfl: 12    fludrocortisone (FLORINEF) 0.1 mg Tab, Take 1 tablet (100 mcg total) by  "mouth once daily., Disp: 30 tablet, Rfl: 1    Review of Systems   Constitution: Positive for fatigue, malaise/fatigue and weight loss (OVER YEARS). Negative for chills, diaphoresis, fever, weakness and night sweats.   HENT: Negative for congestion and nosebleeds.    Eyes: Negative for blurred vision, double vision and visual disturbance.   Cardiovascular: Positive for near-syncope (WITH LOW BP, IN THE SHOWER/HOT). Negative for chest pain (MILD HEAVINESS IN CHEST), claudication, cyanosis, dyspnea on exertion (MILD), irregular heartbeat (OCC), leg swelling, orthopnea, palpitations, paroxysmal nocturnal dyspnea and syncope.   Respiratory: Positive for cough and sputum production. Negative for hemoptysis, shortness of breath and wheezing.    Endocrine: Negative for cold intolerance, heat intolerance and polyuria.   Hematologic/Lymphatic: Negative for adenopathy. Does not bruise/bleed easily.   Skin: Negative for color change, itching and rash.   Musculoskeletal: Negative for back pain, falls and joint pain.   Gastrointestinal: Negative for abdominal pain, change in bowel habit, dysphagia, flatus, heartburn, hematemesis, jaundice, melena and vomiting.   Genitourinary: Positive for nocturia. Negative for dysuria, flank pain, frequency and hematuria.   Neurological: Negative for brief paralysis, dizziness (MILD ORTHOSTASIS), focal weakness, light-headedness, loss of balance, numbness and tremors.   Psychiatric/Behavioral: Negative for altered mental status, depression and memory loss.   Allergic/Immunologic: Negative for hives and persistent infections.        Objective:      Vitals:    07/23/18 1358   BP: (!) 93/52   Pulse: 81   Weight: 46.8 kg (103 lb 2.8 oz)   Height: 5' 8" (1.727 m)   PainSc: 0-No pain     Body mass index is 15.69 kg/m².    Physical Exam   Constitutional: He is oriented to person, place, and time. He appears well-developed and well-nourished. He is active.   SKINNY   HENT:   Head: Normocephalic and " atraumatic.   Mouth/Throat: Oropharynx is clear and moist and mucous membranes are normal.   Eyes: Conjunctivae and EOM are normal. Pupils are equal, round, and reactive to light.   Neck: Normal range of motion. Neck supple. Normal carotid pulses, no hepatojugular reflux and no JVD present. Carotid bruit is not present. No tracheal deviation, no edema and no erythema present. No thyromegaly present.   Cardiovascular: Normal rate and regular rhythm.   No extrasystoles are present. PMI is not displaced.  Exam reveals no gallop, no distant heart sounds, no friction rub and no midsystolic click.    Murmur heard.   Systolic murmur is present with a grade of 1/6  at the lower left sternal border  Pulses:       Carotid pulses are 2+ on the right side, and 2+ on the left side.       Radial pulses are 2+ on the right side, and 2+ on the left side.        Femoral pulses are 2+ on the right side, and 2+ on the left side.       Dorsalis pedis pulses are 2+ on the right side, and 2+ on the left side.        Posterior tibial pulses are 2+ on the right side, and 2+ on the left side.   Pulmonary/Chest: Effort normal. No accessory muscle usage. No tachypnea and no bradypnea. No respiratory distress. He has rales in the right lower field and the left lower field.   MILD FINE BASILAR CRACKLES   Abdominal: Soft. Bowel sounds are normal. He exhibits no distension and no mass. There is no hepatosplenomegaly. There is no tenderness. There is no CVA tenderness.   Musculoskeletal: Normal range of motion. He exhibits no edema or deformity.   Lymphadenopathy:     He has no cervical adenopathy.   Neurological: He is alert and oriented to person, place, and time. He has normal strength. He displays no tremor. No cranial nerve deficit. He exhibits normal muscle tone. Coordination normal.   Skin: Skin is warm and dry. No cyanosis or erythema. No pallor.   Psychiatric: He has a normal mood and affect. His speech is normal and behavior is normal.  Judgment and thought content normal.               ..    Chemistry        Component Value Date/Time     11/28/2017 0801    K 4.3 11/28/2017 0801     11/28/2017 0801    CO2 30 (H) 11/28/2017 0801    BUN 19 11/28/2017 0801    CREATININE 1.0 11/28/2017 0801    GLU 99 11/28/2017 0801        Component Value Date/Time    CALCIUM 9.2 11/28/2017 0801    ALKPHOS 88 11/28/2017 0801    AST 32 11/28/2017 0801    ALT 21 11/28/2017 0801    BILITOT 0.7 11/28/2017 0801    ESTGFRAFRICA >60.0 11/28/2017 0801    EGFRNONAA >60.0 11/28/2017 0801            ..  Lab Results   Component Value Date    CHOL 182 11/28/2017    CHOL 175 05/16/2017    CHOL 166 10/07/2016     Lab Results   Component Value Date    HDL 56 11/28/2017    HDL 46 05/16/2017    HDL 45 10/07/2016     Lab Results   Component Value Date    LDLCALC 108.2 11/28/2017    LDLCALC 112.6 05/16/2017    LDLCALC 106.4 10/07/2016     Lab Results   Component Value Date    TRIG 89 11/28/2017    TRIG 82 05/16/2017    TRIG 73 10/07/2016     Lab Results   Component Value Date    CHOLHDL 30.8 11/28/2017    CHOLHDL 26.3 05/16/2017    CHOLHDL 27.1 10/07/2016     ..  Lab Results   Component Value Date    WBC 5.52 11/28/2017    HGB 12.4 (L) 11/28/2017    HCT 38.2 (L) 11/28/2017    MCV 97 11/28/2017     11/28/2017       Test(s) Reviewed  I have reviewed the following in detail:  [] Stress test   [] Angiography   [] Echocardiogram   [x] Labs   [x] Other:       Assessment:         ICD-10-CM ICD-9-CM   1. Chest heaviness R07.89 786.59   2. Near syncope R55 780.2   3. Orthostatic hypotension I95.1 458.0   4. Fatigue, unspecified type R53.83 780.79   5. Undiagnosed cardiac murmurs R01.1 785.2   6. Nonspecific abnormal electrocardiogram (ECG) (EKG) R94.31 794.31     Problem List Items Addressed This Visit        Cardiac/Vascular    Hypotension    Relevant Orders    Echocardiogram stress test with CFD    Undiagnosed cardiac murmurs    Relevant Orders    Echocardiogram stress test  with CFD    Nonspecific abnormal electrocardiogram (ECG) (EKG)       Other    Chest heaviness - Primary    Relevant Orders    Echocardiogram stress test with CFD    Near syncope    Relevant Orders    Echocardiogram stress test with CFD    Fatigue    Relevant Orders    Echocardiogram stress test with CFD           Plan:     EKG SR, LAE, INCOMPLETE RBBB, WILL CHECK STRESS ECHO, LABS, START FLORINEF, WATCH BP, AVOID HEAT, ALL OTHER CV CLINICALLY STABLE,  NO HF, NO TIA, NO CLINICAL ARRHYTHMIA,CONTINUE CURRENT MEDS, EDUCATION, DIET, EXERCISE, RTC IN FEW WEEKS      Chest heaviness  -     Echocardiogram stress test with CFD; Future    Near syncope  -     Echocardiogram stress test with CFD; Future    Orthostatic hypotension  -     Echocardiogram stress test with CFD; Future    Fatigue, unspecified type  -     Echocardiogram stress test with CFD; Future    Undiagnosed cardiac murmurs  -     Echocardiogram stress test with CFD; Future    Nonspecific abnormal electrocardiogram (ECG) (EKG)    Other orders  -     fludrocortisone (FLORINEF) 0.1 mg Tab; Take 1 tablet (100 mcg total) by mouth once daily.  Dispense: 30 tablet; Refill: 1    RTC Low level/low impact aerobic exercise 5x's/wk. Heart healthy diet and risk factor modification.    See labs and med orders.    Aerobic exercise 5x's/wk. Heart healthy diet and risk factor modification.    See labs and med orders.

## 2018-07-23 NOTE — TELEPHONE ENCOUNTER
----- Message from Joslyn Sara sent at 7/23/2018  3:08 PM CDT -----  Patient states that he need to know where did you send the prescriptions.  Please call patient at [phone].

## 2018-07-30 ENCOUNTER — CLINICAL SUPPORT (OUTPATIENT)
Dept: CARDIOLOGY | Facility: CLINIC | Age: 78
End: 2018-07-30
Attending: INTERNAL MEDICINE
Payer: MEDICARE

## 2018-07-30 VITALS — HEIGHT: 68 IN | BODY MASS INDEX: 15.61 KG/M2 | WEIGHT: 103 LBS

## 2018-07-30 DIAGNOSIS — R55 NEAR SYNCOPE: ICD-10-CM

## 2018-07-30 DIAGNOSIS — R07.89 CHEST HEAVINESS: ICD-10-CM

## 2018-07-30 DIAGNOSIS — R53.83 FATIGUE, UNSPECIFIED TYPE: ICD-10-CM

## 2018-07-30 DIAGNOSIS — R01.1 UNDIAGNOSED CARDIAC MURMURS: ICD-10-CM

## 2018-07-30 DIAGNOSIS — I95.1 ORTHOSTATIC HYPOTENSION: ICD-10-CM

## 2018-07-30 PROCEDURE — 99999 PR PBB SHADOW E&M-EST. PATIENT-LVL I: CPT | Mod: PBBFAC,,,

## 2018-07-30 PROCEDURE — 93351 STRESS TTE COMPLETE: CPT | Mod: S$GLB,,, | Performed by: INTERNAL MEDICINE

## 2018-08-01 ENCOUNTER — TELEPHONE (OUTPATIENT)
Dept: FAMILY MEDICINE | Facility: CLINIC | Age: 78
End: 2018-08-01

## 2018-08-01 ENCOUNTER — TELEPHONE (OUTPATIENT)
Dept: PULMONOLOGY | Facility: CLINIC | Age: 78
End: 2018-08-01

## 2018-08-01 LAB
ASCENDING AORTA: 1.74 CM
AV MEAN GRADIENT: 2.1 MMHG
AV PEAK GRADIENT: 4.48 MMHG
AV VALVE AREA: 1.94 CM2
BSA FOR ECHO PROCEDURE: 1.5 M2
CV ECHO LV RWT: 0.59 CM
CV STRESS BASE HR: 88
CVPEAKDIABP: 52
CVPEAKSYSBP: 124
CVRESTDIABP: 60
CVRESTSYSBP: 94
DOP CALC AO PEAK VEL: 1.06 M/S
DOP CALC AO VTI: 24.65 CM
DOP CALC LVOT AREA: 3.11 CM2
DOP CALC LVOT DIAMETER: 1.99 CM
DOP CALC LVOT STROKE VOLUME: 47.87 CM3
DOP CALCLVOT PEAK VEL VTI: 15.4 CM
E WAVE DECELERATION TIME: 239.22 MSEC
E/A RATIO: 1.54
E/E' RATIO: 11.67
ECHO LV POSTERIOR WALL: 0.92 CM (ref 0.6–1.1)
FRACTIONAL SHORTENING: 35 % (ref 28–44)
INTERVENTRICULAR SEPTUM: 0.87 CM (ref 0.6–1.1)
LA MAJOR: 4.37 CM
LA MINOR: 5.33 CM
LA WIDTH: 2.91 CM
LEFT ATRIUM SIZE: 2 CM
LEFT ATRIUM VOLUME INDEX: 15.8 ML/M2
LEFT ATRIUM VOLUME: 23.76 CM3
LEFT INTERNAL DIMENSION IN SYSTOLE: 1.95 CM (ref 2.1–4)
LEFT VENTRICLE MASS INDEX: 46.8 G/M2
LEFT VENTRICULAR INTERNAL DIMENSION IN DIASTOLE: 3.02 CM (ref 3.5–6)
LEFT VENTRICULAR MASS: 70.27 G
LV LATERAL E/E' RATIO: 11.67
LV SEPTAL E/E' RATIO: 11.67
MV PEAK A VEL: 0.68 M/S
MV PEAK E VEL: 1.05 M/S
MV STENOSIS PRESSURE HALF TIME: 69.37 MS
MV VALVE AREA P 1/2 METHOD: 3.17 CM2
PISA TR MAX VEL: 3.15 M/S
PULM VEIN S/D RATIO: 0.77
PV PEAK D VEL: 0.83 M/S
PV PEAK S VEL: 0.64 M/S
RA MAJOR: 4.29 CM
RA PRESSURE: 3 MMHG
RA WIDTH: 3.74 CM
RIGHT VENTRICULAR END-DIASTOLIC DIMENSION: 3.82 CM
SINUS: 3.09 CM
STJ: 2.11 CM
STRESS ECHO POST ESTIMATED WORKLOAD: 11 METS
STRESS ECHO POST EXERCISE DUR MIN: 6 MIN
STRESS ECHO POST EXERCISE DUR SEC: 41
STRESS ST DEPRESSION: 1 MM
TDI LATERAL: 0.09
TDI SEPTAL: 0.09
TDI: 0.09
TR MAX PG: 39.69 MMHG
TRICUSPID ANNULAR PLANE SYSTOLIC EXCURSION: 0.02 CM
TV REST PULMONARY ARTERY PRESSURE: 42.69 MMHG

## 2018-08-01 NOTE — TELEPHONE ENCOUNTER
----- Message from Naz Jeffers sent at 8/1/2018  9:08 AM CDT -----  Contact: Leonarda   tel: [phone]  Needs Advice    Reason for call:    When he left for [place], he forgot to bring his antibiotics and is supposed to have a lab today.   Pt.says he feels stable right  Now and may not need the antibiotics.   Do  You still want him to do the labs, even though he has not been on the medications?   Communication Preference:  Phone   Additional Information:  Pls call and discuss some questions w/ him.

## 2018-08-01 NOTE — TELEPHONE ENCOUNTER
Advised patient labs are available for scheduling and advised patient to contact Dr Summers regarding CXR.

## 2018-08-01 NOTE — TELEPHONE ENCOUNTER
----- Message from Briana Cool sent at 7/31/2018  4:44 PM CDT -----  Type: Needs Medical Advice    Who Called:  Patient  Best Call Back Number: [phone]  Additional Information: Patient wants to know if the doctor still wants him to do his fasting labs and if he needs to get his chest xray done again. Please call to advise.

## 2018-08-02 ENCOUNTER — TELEPHONE (OUTPATIENT)
Dept: CARDIOLOGY | Facility: CLINIC | Age: 78
End: 2018-08-02

## 2018-08-20 ENCOUNTER — TELEPHONE (OUTPATIENT)
Dept: CARDIOLOGY | Facility: CLINIC | Age: 78
End: 2018-08-20

## 2018-08-20 NOTE — TELEPHONE ENCOUNTER
----- Message from Jaz Benson sent at 8/20/2018 10:55 AM CDT -----  Contact: pt [phone]  Patient called and asked if he needs to have blood work before his appt. Please call back.

## 2018-08-20 NOTE — TELEPHONE ENCOUNTER
Spoke to patient and informed that there are no orders for blood work before appt with Dr. Frey. Pt verbalized understanding.

## 2018-08-31 ENCOUNTER — TELEPHONE (OUTPATIENT)
Dept: FAMILY MEDICINE | Facility: CLINIC | Age: 78
End: 2018-08-31

## 2018-08-31 NOTE — TELEPHONE ENCOUNTER
----- Message from Monica Meyers sent at 8/31/2018 12:55 PM CDT -----  Contact: self  Type:  Sooner Apoointment Request    Caller is requesting a sooner appointment.  Caller declined first available appointment listed below.  Caller will not accept being placed on the waitlist and is requesting a message be sent to doctor.    Name of Caller:  self  When is the first available appointment?  11/23/18  Symptoms:  talk about pulmonary issues and chest xray  Best Call Back Number:  [phone]  Additional Information:  Patient needs to speak to office regarding his care. Please call patient. Thanks!

## 2018-09-04 ENCOUNTER — OFFICE VISIT (OUTPATIENT)
Dept: FAMILY MEDICINE | Facility: CLINIC | Age: 78
End: 2018-09-04
Payer: MEDICARE

## 2018-09-04 ENCOUNTER — HOSPITAL ENCOUNTER (OUTPATIENT)
Dept: RADIOLOGY | Facility: HOSPITAL | Age: 78
Discharge: HOME | End: 2018-09-04
Attending: NURSE PRACTITIONER
Payer: MEDICARE

## 2018-09-04 VITALS
SYSTOLIC BLOOD PRESSURE: 80 MMHG | TEMPERATURE: 98 F | RESPIRATION RATE: 18 BRPM | WEIGHT: 102.31 LBS | BODY MASS INDEX: 15.51 KG/M2 | HEIGHT: 68 IN | OXYGEN SATURATION: 95 % | DIASTOLIC BLOOD PRESSURE: 60 MMHG | HEART RATE: 83 BPM

## 2018-09-04 DIAGNOSIS — R63.0 POOR APPETITE: ICD-10-CM

## 2018-09-04 DIAGNOSIS — J44.9 CHRONIC OBSTRUCTIVE PULMONARY DISEASE, UNSPECIFIED COPD TYPE: ICD-10-CM

## 2018-09-04 DIAGNOSIS — R63.4 WEIGHT LOSS, UNINTENTIONAL: ICD-10-CM

## 2018-09-04 DIAGNOSIS — E44.0 MALNUTRITION OF MODERATE DEGREE: ICD-10-CM

## 2018-09-04 DIAGNOSIS — R05.9 COUGH IN ADULT PATIENT: ICD-10-CM

## 2018-09-04 DIAGNOSIS — J84.10 PULMONARY FIBROSIS: ICD-10-CM

## 2018-09-04 DIAGNOSIS — Z86.19: ICD-10-CM

## 2018-09-04 DIAGNOSIS — R05.9 COUGH IN ADULT PATIENT: Primary | ICD-10-CM

## 2018-09-04 DIAGNOSIS — I95.89 OTHER SPECIFIED HYPOTENSION: ICD-10-CM

## 2018-09-04 PROCEDURE — 99214 OFFICE O/P EST MOD 30 MIN: CPT | Mod: PBBFAC,25,PO | Performed by: NURSE PRACTITIONER

## 2018-09-04 PROCEDURE — 99214 OFFICE O/P EST MOD 30 MIN: CPT | Mod: S$PBB,,, | Performed by: NURSE PRACTITIONER

## 2018-09-04 PROCEDURE — 1101F PT FALLS ASSESS-DOCD LE1/YR: CPT | Mod: CPTII,,, | Performed by: NURSE PRACTITIONER

## 2018-09-04 PROCEDURE — 71046 X-RAY EXAM CHEST 2 VIEWS: CPT | Mod: TC,FY,PO

## 2018-09-04 PROCEDURE — 99999 PR PBB SHADOW E&M-EST. PATIENT-LVL IV: CPT | Mod: PBBFAC,,, | Performed by: NURSE PRACTITIONER

## 2018-09-04 PROCEDURE — 71046 X-RAY EXAM CHEST 2 VIEWS: CPT | Mod: 26,,, | Performed by: RADIOLOGY

## 2018-09-04 RX ORDER — MEGESTROL ACETATE 40 MG/ML
400 SUSPENSION ORAL 2 TIMES DAILY
Qty: 1800 ML | Refills: 3 | Status: SHIPPED | OUTPATIENT
Start: 2018-09-04 | End: 2019-01-14

## 2018-09-04 NOTE — PROGRESS NOTES
"Subjective:       Patient ID: Leonarda Almazan is a 78 y.o. male.    Chief Complaint: Cough  He was last seen in primary care by Hayden on 05/03/2018. This is his first time seeing me in the clinic.   HPI   He is here with complaints of cough,   I have reviewed his recent pulmonary visit on 05/16/2018 and he has a documented history of mycobacterium avium infection. He was last treated with Cipro and Azithromycin on 05/16/2018.   He states today that he has completed  6 months of the antibiotic.  He states started having cough again and was placed on 3 more months of antibiotics.  States he went to [facility] after the new prescription and did not come back until July. Unfortunately [place] and [place] not resume upon return. States he "threw it away". He states that he had not taken any of the additional three months of medication because he have not heard back from pulmonary department since 08/01/2018.  Today he presents complaining of cough with some slight phlegm when lie down.   He would like to know if he should be back on antibiotic.    Vitals:    09/04/18 0944   BP: (!) 80/60   Pulse: 83   Resp: 18   Temp: 97.5 °F (36.4 °C)     BP Readings from Last 3 Encounters:   09/04/18 (!) 80/60   07/23/18 (!) 93/52   05/03/18 (!) 96/56     Review of Systems    Lab Results   Component Value Date    TSH 1.390 11/28/2017     CMP  Sodium   Date Value Ref Range Status   11/28/2017 139 136 - 145 mmol/L Final     Potassium   Date Value Ref Range Status   11/28/2017 4.3 3.5 - 5.1 mmol/L Final     Chloride   Date Value Ref Range Status   11/28/2017 102 95 - 110 mmol/L Final     CO2   Date Value Ref Range Status   11/28/2017 30 (H) 23 - 29 mmol/L Final     Glucose   Date Value Ref Range Status   11/28/2017 99 70 - 110 mg/dL Final     BUN, Bld   Date Value Ref Range Status   11/28/2017 19 8 - 23 mg/dL Final     Creatinine   Date Value Ref Range Status   11/28/2017 1.0 0.5 - 1.4 mg/dL Final "     Calcium   Date Value Ref Range Status   11/28/2017 9.2 8.7 - 10.5 mg/dL Final     Total Protein   Date Value Ref Range Status   11/28/2017 7.9 6.0 - 8.4 g/dL Final     Albumin   Date Value Ref Range Status   11/28/2017 3.3 (L) 3.5 - 5.2 g/dL Final     Total Bilirubin   Date Value Ref Range Status   11/28/2017 0.7 0.1 - 1.0 mg/dL Final     Comment:     For infants and newborns, interpretation of results should be based  on gestational age, weight and in agreement with clinical  observations.  Premature Infant recommended reference ranges:  Up to 24 hours.............<8.0 mg/dL  Up to 48 hours............<12.0 mg/dL  3-5 days..................<15.0 mg/dL  6-29 days.................<15.0 mg/dL       Alkaline Phosphatase   Date Value Ref Range Status   11/28/2017 88 55 - 135 U/L Final     AST   Date Value Ref Range Status   11/28/2017 32 10 - 40 U/L Final     ALT   Date Value Ref Range Status   11/28/2017 21 10 - 44 U/L Final     Anion Gap   Date Value Ref Range Status   11/28/2017 7 (L) 8 - 16 mmol/L Final     eGFR if    Date Value Ref Range Status   11/28/2017 >60.0 >60 mL/min/1.73 m^2 Final     eGFR if non    Date Value Ref Range Status   11/28/2017 >60.0 >60 mL/min/1.73 m^2 Final     Comment:     Calculation used to obtain the estimated glomerular filtration  rate (eGFR) is the CKD-EPI equation.        Objective:      Physical Exam   Constitutional: He is oriented to person, place, and time. He appears well-nourished. He appears cachectic. He is active and cooperative. He appears ill.   Very thin and malnourished appearance. Has some noticeable facial appearance of wasting near temporal region   HENT:   Head: Normocephalic and atraumatic.   Right Ear: Hearing, tympanic membrane, external ear and ear canal normal.   Left Ear: Hearing, tympanic membrane, external ear and ear canal normal.   Nose: Nose normal.   Mouth/Throat: Uvula is midline, oropharynx is clear and moist and mucous  membranes are normal.   Eyes: Lids are normal.   Neck: Trachea normal, normal range of motion, full passive range of motion without pain and phonation normal. Neck supple.   Cardiovascular: Normal rate and regular rhythm.   Pulmonary/Chest: Effort normal. He has decreased breath sounds.   Diminished posteriorly   Abdominal: Soft. There is no hepatomegaly. There is no tenderness.       Musculoskeletal: Normal range of motion.   Neurological: He is alert and oriented to person, place, and time.   Skin: Skin is warm, dry and intact.   Psychiatric: He has a normal mood and affect. His speech is normal and behavior is normal. Judgment and thought content normal. Cognition and memory are normal.   Nursing note and vitals reviewed.      Assessment & Plan:       Cough in adult patient  -     X-Ray Chest PA And Lateral; Future; Expected date: 09/04/2018    Chronic obstructive pulmonary disease, unspecified COPD type    History of atypical mycobacterial infection  -     X-Ray Chest PA And Lateral; Future; Expected date: 09/04/2018    Pulmonary fibrosis    Malnutrition of moderate degree  -     megestrol (MEGACE) 400 mg/10 mL (40 mg/mL) Susp; Take 10 mLs (400 mg total) by mouth 2 (two) times daily.  Dispense: 1800 mL; Refill: 3  -     X-Ray Chest PA And Lateral; Future; Expected date: 09/04/2018    Weight loss, unintentional  -     megestrol (MEGACE) 400 mg/10 mL (40 mg/mL) Susp; Take 10 mLs (400 mg total) by mouth 2 (two) times daily.  Dispense: 1800 mL; Refill: 3  -     X-Ray Chest PA And Lateral; Future; Expected date: 09/04/2018    Poor appetite  -     megestrol (MEGACE) 400 mg/10 mL (40 mg/mL) Susp; Take 10 mLs (400 mg total) by mouth 2 (two) times daily.  Dispense: 1800 mL; Refill: 3  -     X-Ray Chest PA And Lateral; Future; Expected date: 09/04/2018    Other specified hypotension    I have expressed to this patient in great detail the need to follow up with his pulmonologist. He is questioning the continuation of his  antibiotics. I have informed him that he had a total of 12 refills on the cipro that Dr. Summers ordered on 05/16/2018.  I will try reaching out to pulmonary for a follow up appt for Mr. Almazan.       Follow-up if symptoms worsen or fail to improve.

## 2018-09-06 ENCOUNTER — DOCUMENTATION ONLY (OUTPATIENT)
Dept: FAMILY MEDICINE | Facility: CLINIC | Age: 78
End: 2018-09-06

## 2018-09-06 NOTE — PROGRESS NOTES
PA initiated 9/6/18 for Megestrol    [facility]: [id number] - [id number]  [employer]  ________________________  DENIED

## 2018-09-07 ENCOUNTER — TELEPHONE (OUTPATIENT)
Dept: FAMILY MEDICINE | Facility: CLINIC | Age: 78
End: 2018-09-07

## 2018-09-17 ENCOUNTER — OFFICE VISIT (OUTPATIENT)
Dept: CARDIOLOGY | Facility: CLINIC | Age: 78
End: 2018-09-17
Payer: MEDICARE

## 2018-09-17 DIAGNOSIS — I36.1 NON-RHEUMATIC TRICUSPID VALVE INSUFFICIENCY: ICD-10-CM

## 2018-09-17 DIAGNOSIS — I34.0 NON-RHEUMATIC MITRAL REGURGITATION: Primary | ICD-10-CM

## 2018-09-17 DIAGNOSIS — I95.9 HYPOTENSION, UNSPECIFIED HYPOTENSION TYPE: ICD-10-CM

## 2018-09-17 PROCEDURE — 99213 OFFICE O/P EST LOW 20 MIN: CPT | Mod: PBBFAC,PO | Performed by: INTERNAL MEDICINE

## 2018-09-17 PROCEDURE — 99213 OFFICE O/P EST LOW 20 MIN: CPT | Mod: S$PBB,,, | Performed by: INTERNAL MEDICINE

## 2018-09-17 PROCEDURE — 99999 PR PBB SHADOW E&M-EST. PATIENT-LVL III: CPT | Mod: PBBFAC,,, | Performed by: INTERNAL MEDICINE

## 2018-09-17 PROCEDURE — 1101F PT FALLS ASSESS-DOCD LE1/YR: CPT | Mod: CPTII,,, | Performed by: INTERNAL MEDICINE

## 2018-09-17 RX ORDER — MIDODRINE HYDROCHLORIDE 5 MG/1
5 TABLET ORAL 2 TIMES DAILY WITH MEALS
Qty: 60 TABLET | Refills: 3 | Status: SHIPPED | OUTPATIENT
Start: 2018-09-17 | End: 2018-09-17 | Stop reason: SDUPTHER

## 2018-09-17 RX ORDER — MIDODRINE HYDROCHLORIDE 5 MG/1
5 TABLET ORAL 2 TIMES DAILY WITH MEALS
Qty: 60 TABLET | Refills: 3 | Status: SHIPPED | OUTPATIENT
Start: 2018-09-17 | End: 2019-01-14

## 2018-09-17 NOTE — PROGRESS NOTES
Subjective:    Patient ID:  Leonarda Almazan is a 78 y.o. male who presents for Follow-up (Echo) and Dizziness        HPI  DISCUSSED TESTS, MILD MR, NEGATIVE STRESS ECHO, BP LOW, DID NOT TAKE MEDS, SEE ROS    Past Medical History:   Diagnosis Date    Anemia 2/19/2013    Bronchiectasis without acute exacerbation     Colon polyp     Cough     Elevated PSA     GERD (gastroesophageal reflux disease)     on herbal med    Herpes zoster w/ nervous system complication 2012    still has some pain, takes no meds    Hypothyroidism     Lung nodules     followed by Dr. Montoya    Post herpetic neuralgia      Past Surgical History:   Procedure Laterality Date    APPENDECTOMY      COLONOSCOPY  9/15/2008  Raudel    Two 1 mm polyps in the ascending colon.  HYPERPLASTIC POLYPS.      COLONOSCOPY N/A 5/28/2015    Performed by Jr Starks Jr., MD at [facility]    EGD (ESOPHAGOGASTRODUODENOSCOPY) N/A 10/22/2012    Performed by Jr Starks Jr., MD at [facility]    ESOPHAGOGASTRODUODENOSCOPY  10/16/2012  Raudel    Non-erosive esophageal reflux (NERD) disease???.   Gastric mucosal atrophy.  MILD CHRONIC GASTRITIS WITH INTESTINAL METAPLASIA.  Otherwise normal stomach and duodenum.  CLOtest negative.    ESOPHAGOGASTRODUODENOSCOPY (EGD) N/A 9/30/2016    Performed by Jr Starks Jr., [facility] at [facility]    ESOPHAGOGASTRODUODENOSCOPY (EGD) N/A 5/28/2015    Performed by Jr Starks Jr., MD at [facility] END[facility]    EXCISION-PTERYGIUM Left 5/13/2015    Performed by Jazmyne Sheppard MD at [facility] OR    EYE SURGERY      OS, not cataract, pt unsure of name    UPPER GASTROINTESTINAL ENDOSCOPY  9/15/2008  Raudel    Normal esophagus.   Symptoms probably due to NERD.  Slight gastric mucosal atrophy.   Normal examined duodenum.     Family History   Problem Relation Age of Onset    Diabetes Neg Hx     Glaucoma Neg Hx     Macular degeneration Neg Hx     Hypertension Neg Hx     Retinal detachment Neg Hx     Strabismus Neg Hx      Stroke Neg Hx     Thyroid disease Neg Hx     Cataracts Neg Hx     Cancer Neg Hx     Blindness Neg Hx     Amblyopia Neg Hx      Social History     Socioeconomic History    Marital status:      Spouse name: Not on file    Number of children: Not on file    Years of education: Not on file    Highest education level: Not on file   Social Needs    Financial resource strain: Not on file    Food insecurity - worry: Not on file    Food insecurity - inability: Not on file    Transportation needs - medical: Not on file    Transportation needs - non-medical: Not on file   Occupational History    Not on file   Tobacco Use    Smoking status: Never Smoker    Smokeless tobacco: Never Used   Substance and Sexual Activity    Alcohol use: No    Drug use: No    Sexual activity: No     Partners: Female   Other Topics Concern    Not on file   Social History Narrative    Not on file       Review of patient's allergies indicates:  No Known Allergies    Current Outpatient Medications:     levothyroxine (SYNTHROID) 75 MCG tablet, Take 1 tablet (75 mcg total) by mouth before breakfast., Disp: 90 tablet, Rfl: 3    multivitamin (ONE DAILY MULTIVITAMIN) per tablet, Take 1 tablet by mouth once daily., Disp: , Rfl:     ranitidine (ZANTAC) 150 MG capsule, Take 150 mg by mouth nightly., Disp: , Rfl:     aluminum hydrox-magnesium carb (GAVISCON)  mg/15 mL Susp, Take 30 mLs by mouth every evening. 2 tablespoons at bedtime., Disp: , Rfl: 0    megestrol (MEGACE) 400 mg/10 mL (40 mg/mL) Susp, Take 10 mLs (400 mg total) by mouth 2 (two) times daily., Disp: 1800 mL, Rfl: 3    midodrine (PROAMATINE) 5 MG Tab, Take 1 tablet (5 mg total) by mouth 2 (two) times daily with meals., Disp: 60 tablet, Rfl: 3    Review of Systems   Constitution: Positive for weight loss (OVER YEARS). Negative for chills, diaphoresis, fever, weakness and night sweats.   HENT: Negative for congestion and nosebleeds.    Eyes: Negative for  "blurred vision, double vision and visual disturbance.   Cardiovascular: Negative for chest pain, claudication, cyanosis, dyspnea on exertion (MILD), irregular heartbeat (OCC), leg swelling, near-syncope (WITH LOW BP, IN THE SHOWER/HOT), orthopnea, palpitations, paroxysmal nocturnal dyspnea and syncope.   Respiratory: Negative for hemoptysis, shortness of breath and wheezing.    Endocrine: Negative for cold intolerance, heat intolerance and polyuria.   Hematologic/Lymphatic: Negative for adenopathy. Does not bruise/bleed easily.   Skin: Negative for color change, itching and rash.   Musculoskeletal: Negative for back pain, falls and joint pain.   Gastrointestinal: Negative for abdominal pain, change in bowel habit, dysphagia, flatus, heartburn, hematemesis, jaundice, melena and vomiting.   Genitourinary: Positive for nocturia. Negative for dysuria, flank pain, frequency and hematuria.   Neurological: Negative for brief paralysis, dizziness (MILD ORTHOSTASIS), focal weakness, light-headedness, loss of balance, numbness and tremors.   Psychiatric/Behavioral: Negative for altered mental status, depression and memory loss.   Allergic/Immunologic: Negative for hives and persistent infections.        Objective:      Vitals:    09/17/18 1532   BP: (!) 76/46   Pulse: 83   Weight: 45.9 kg (101 lb 3.1 oz)   Height: 5' 8" (1.727 m)   PainSc: 0-No pain     Body mass index is 15.39 kg/m².    Physical Exam   Constitutional: He is oriented to person, place, and time. He appears well-developed and well-nourished. He is active.   SKINNY   HENT:   Head: Normocephalic and atraumatic.   Mouth/Throat: Oropharynx is clear and moist and mucous membranes are normal.   Eyes: Conjunctivae and EOM are normal. Pupils are equal, round, and reactive to light.   Neck: Normal range of motion. Neck supple. Normal carotid pulses, no hepatojugular reflux and no JVD present. Carotid bruit is not present. No tracheal deviation, no edema and no erythema " present. No thyromegaly present.   Cardiovascular: Normal rate and regular rhythm.  No extrasystoles are present. PMI is not displaced. Exam reveals no gallop, no distant heart sounds, no friction rub and no midsystolic click.   Murmur heard.   Systolic murmur is present with a grade of 1/6 at the lower left sternal border.  Pulses:       Carotid pulses are 2+ on the right side, and 2+ on the left side.       Radial pulses are 2+ on the right side, and 2+ on the left side.        Femoral pulses are 2+ on the right side, and 2+ on the left side.       Dorsalis pedis pulses are 2+ on the right side, and 2+ on the left side.        Posterior tibial pulses are 2+ on the right side, and 2+ on the left side.   Pulmonary/Chest: Effort normal. No accessory muscle usage. No tachypnea and no bradypnea. No respiratory distress. He has rales in the right lower field and the left lower field.   MILD FINE BASILAR CRACKLES   Abdominal: Soft. Bowel sounds are normal. He exhibits no distension and no mass. There is no hepatosplenomegaly. There is no tenderness. There is no CVA tenderness.   Musculoskeletal: Normal range of motion. He exhibits no edema or deformity.   Lymphadenopathy:     He has no cervical adenopathy.   Neurological: He is alert and oriented to person, place, and time. He has normal strength. He displays no tremor. No cranial nerve deficit. He exhibits normal muscle tone. Coordination normal.   Skin: Skin is warm and dry. No cyanosis or erythema. No pallor.   Psychiatric: He has a normal mood and affect. His speech is normal and behavior is normal. Judgment and thought content normal.               ..    Chemistry        Component Value Date/Time     11/28/2017 0801    K 4.3 11/28/2017 0801     11/28/2017 0801    CO2 30 (H) 11/28/2017 0801    BUN 19 11/28/2017 0801    CREATININE 1.0 11/28/2017 0801    GLU 99 11/28/2017 0801        Component Value Date/Time    CALCIUM 9.2 11/28/2017 0801    ALKPHOS 88  11/28/2017 0801    AST 32 11/28/2017 0801    ALT 21 11/28/2017 0801    BILITOT 0.7 11/28/2017 0801    ESTGFRAFRICA >60.0 11/28/2017 0801    EGFRNONAA >60.0 11/28/2017 0801            ..  Lab Results   Component Value Date    CHOL 182 11/28/2017    CHOL 175 05/16/2017    CHOL 166 10/07/2016     Lab Results   Component Value Date    HDL 56 11/28/2017    HDL 46 05/16/2017    HDL 45 10/07/2016     Lab Results   Component Value Date    LDLCALC 108.2 11/28/2017    LDLCALC 112.6 05/16/2017    LDLCALC 106.4 10/07/2016     Lab Results   Component Value Date    TRIG 89 11/28/2017    TRIG 82 05/16/2017    TRIG 73 10/07/2016     Lab Results   Component Value Date    CHOLHDL 30.8 11/28/2017    CHOLHDL 26.3 05/16/2017    CHOLHDL 27.1 10/07/2016     ..  Lab Results   Component Value Date    WBC 5.52 11/28/2017    HGB 12.4 (L) 11/28/2017    HCT 38.2 (L) 11/28/2017    MCV 97 11/28/2017     11/28/2017       Test(s) Reviewed  I have reviewed the following in detail:  [] Stress test   [] Angiography   [] Echocardiogram   [] Labs   [] Other:       Assessment:         ICD-10-CM ICD-9-CM   1. Non-rheumatic mitral regurgitation I34.0 424.0   2. Hypotension, unspecified hypotension type I95.9 458.9   3. Non-rheumatic tricuspid valve insufficiency I36.1 424.2     Problem List Items Addressed This Visit        Cardiac/Vascular    Hypotension    Non-rheumatic mitral regurgitation - Primary    Non-rheumatic tricuspid valve insufficiency           Plan:     ADD MIDODRINE , INCREASE INTAKE, ALL OTHER CV CLINICALLY STABLE, NO ANGINA, NO HF, NO TIA, NO CLINICAL ARRHYTHMIA,CONTINUE CURRENT MEDS, EDUCATION,  EXERCISE      Non-rheumatic mitral regurgitation    Hypotension, unspecified hypotension type    Non-rheumatic tricuspid valve insufficiency    Other orders  -     midodrine (PROAMATINE) 5 MG Tab; Take 1 tablet (5 mg total) by mouth 2 (two) times daily with meals.  Dispense: 60 tablet; Refill: 3    RTC Low level/low impact aerobic exercise  5x's/wk. Heart healthy diet and risk factor modification.    See labs and med orders.    Aerobic exercise 5x's/wk. Heart healthy diet and risk factor modification.    See labs and med orders.

## 2018-09-17 NOTE — TELEPHONE ENCOUNTER
----- Message from Israel Harper sent at 9/17/2018  4:36 PM CDT -----  Patient stated prescription has not been sent to [facility] yet. Please call to advise. [phone]

## 2018-09-18 ENCOUNTER — TELEPHONE (OUTPATIENT)
Dept: FAMILY MEDICINE | Facility: CLINIC | Age: 78
End: 2018-09-18

## 2018-09-18 NOTE — TELEPHONE ENCOUNTER
Received decision from [facility] concerning the appeal for patients Megestrol.  It is still denied.  Please advise

## 2018-09-19 VITALS
WEIGHT: 101.19 LBS | BODY MASS INDEX: 15.34 KG/M2 | SYSTOLIC BLOOD PRESSURE: 80 MMHG | HEART RATE: 83 BPM | HEIGHT: 68 IN | DIASTOLIC BLOOD PRESSURE: 46 MMHG

## 2018-10-12 ENCOUNTER — PATIENT MESSAGE (OUTPATIENT)
Dept: FAMILY MEDICINE | Facility: CLINIC | Age: 78
End: 2018-10-12

## 2019-01-14 ENCOUNTER — HOSPITAL ENCOUNTER (OUTPATIENT)
Dept: RADIOLOGY | Facility: HOSPITAL | Age: 79
Discharge: HOME | End: 2019-01-14
Attending: FAMILY MEDICINE
Payer: MEDICARE

## 2019-01-14 ENCOUNTER — OFFICE VISIT (OUTPATIENT)
Dept: FAMILY MEDICINE | Facility: CLINIC | Age: 79
End: 2019-01-14
Payer: MEDICARE

## 2019-01-14 VITALS
RESPIRATION RATE: 20 BRPM | HEIGHT: 68 IN | SYSTOLIC BLOOD PRESSURE: 98 MMHG | BODY MASS INDEX: 15.14 KG/M2 | HEART RATE: 84 BPM | WEIGHT: 99.88 LBS | DIASTOLIC BLOOD PRESSURE: 62 MMHG

## 2019-01-14 DIAGNOSIS — J44.9 CHRONIC OBSTRUCTIVE PULMONARY DISEASE, UNSPECIFIED COPD TYPE: ICD-10-CM

## 2019-01-14 DIAGNOSIS — K86.2 PANCREAS CYST: ICD-10-CM

## 2019-01-14 DIAGNOSIS — E03.9 HYPOTHYROIDISM, UNSPECIFIED TYPE: ICD-10-CM

## 2019-01-14 DIAGNOSIS — J84.10 PULMONARY FIBROSIS: ICD-10-CM

## 2019-01-14 DIAGNOSIS — Z00.00 WELLNESS EXAMINATION: Primary | ICD-10-CM

## 2019-01-14 DIAGNOSIS — A31.0 MYCOBACTERIUM AVIUM INFECTION: ICD-10-CM

## 2019-01-14 DIAGNOSIS — K21.9 GASTROESOPHAGEAL REFLUX DISEASE, ESOPHAGITIS PRESENCE NOT SPECIFIED: ICD-10-CM

## 2019-01-14 DIAGNOSIS — E44.0 MALNUTRITION OF MODERATE DEGREE: ICD-10-CM

## 2019-01-14 DIAGNOSIS — I70.0 AORTIC ATHEROSCLEROSIS: ICD-10-CM

## 2019-01-14 PROBLEM — R53.83 FATIGUE: Status: RESOLVED | Noted: 2018-07-23 | Resolved: 2019-01-14

## 2019-01-14 PROBLEM — R55 NEAR SYNCOPE: Status: RESOLVED | Noted: 2018-07-23 | Resolved: 2019-01-14

## 2019-01-14 PROBLEM — R07.89 CHEST HEAVINESS: Status: RESOLVED | Noted: 2018-07-23 | Resolved: 2019-01-14

## 2019-01-14 PROCEDURE — 99397 PER PM REEVAL EST PAT 65+ YR: CPT | Mod: S$GLB,,, | Performed by: FAMILY MEDICINE

## 2019-01-14 PROCEDURE — 99499 RISK ADDL DX/OHS AUDIT: ICD-10-PCS | Mod: S$GLB,,, | Performed by: FAMILY MEDICINE

## 2019-01-14 PROCEDURE — 76705 ECHO EXAM OF ABDOMEN: CPT | Mod: 26,,, | Performed by: RADIOLOGY

## 2019-01-14 PROCEDURE — 99999 PR PBB SHADOW E&M-EST. PATIENT-LVL III: ICD-10-PCS | Mod: PBBFAC,,, | Performed by: FAMILY MEDICINE

## 2019-01-14 PROCEDURE — 99499 UNLISTED E&M SERVICE: CPT | Mod: S$GLB,,, | Performed by: FAMILY MEDICINE

## 2019-01-14 PROCEDURE — 76705 US ABDOMEN LIMITED: ICD-10-PCS | Mod: 26,,, | Performed by: RADIOLOGY

## 2019-01-14 PROCEDURE — 76705 ECHO EXAM OF ABDOMEN: CPT | Mod: TC,PO

## 2019-01-14 PROCEDURE — 99999 PR PBB SHADOW E&M-EST. PATIENT-LVL III: CPT | Mod: PBBFAC,,, | Performed by: FAMILY MEDICINE

## 2019-01-14 PROCEDURE — 99397 PR PREVENTIVE VISIT,EST,65 & OVER: ICD-10-PCS | Mod: S$GLB,,, | Performed by: FAMILY MEDICINE

## 2019-01-14 RX ORDER — MEGESTROL ACETATE 40 MG/1
40 TABLET ORAL DAILY
Qty: 90 TABLET | Refills: 1 | Status: SHIPPED | OUTPATIENT
Start: 2019-01-14 | End: 2019-12-05

## 2019-01-14 RX ORDER — LEVOTHYROXINE SODIUM 75 UG/1
75 TABLET ORAL
Qty: 90 TABLET | Refills: 3 | Status: SHIPPED | OUTPATIENT
Start: 2019-01-14 | End: 2020-03-16 | Stop reason: SDUPTHER

## 2019-01-14 NOTE — PROGRESS NOTES
Subjective:       Patient ID: Leonarda Almazan is a 78 y.o. male.    Chief Complaint: Annual Exam    Here for wellness and f/u chronic medical issues.  Doing well overall.  History of MAC and malnutrition but stable. Copd stable.      Cough   This is a chronic problem. The current episode started more than 1 year ago. The problem has been unchanged. The problem occurs every few hours. The cough is productive of brown sputum. Associated symptoms include rhinorrhea and weight loss. Pertinent negatives include no chest pain, chills, ear congestion, ear pain, fever, headaches, heartburn, hemoptysis, myalgias, postnasal drip, rash, sore throat, shortness of breath, sweats or wheezing. The symptoms are aggravated by cold air, dust, lying down and pollens. He has tried nothing for the symptoms. His past medical history is significant for bronchiectasis, bronchitis, COPD, environmental allergies and pneumonia. There is no history of asthma or emphysema.     Review of Systems   Constitutional: Positive for weight loss. Negative for chills and fever.   HENT: Positive for rhinorrhea. Negative for ear pain, postnasal drip and sore throat.    Respiratory: Positive for cough. Negative for hemoptysis, shortness of breath and wheezing.    Cardiovascular: Negative for chest pain.   Gastrointestinal: Negative for heartburn.   Musculoskeletal: Negative for myalgias.   Skin: Negative for rash.   Allergic/Immunologic: Positive for environmental allergies.   Neurological: Negative for headaches.       Objective:      Physical Exam   Constitutional: He appears well-developed and well-nourished.   HENT:   Head: Normocephalic and atraumatic.   Cardiovascular: Normal rate, regular rhythm and normal heart sounds.   Pulmonary/Chest: Effort normal and breath sounds normal.   Abdominal: Soft. Bowel sounds are normal.   Psychiatric: He has a normal mood and affect.   Nursing note and vitals reviewed.      Assessment:       1. Wellness examination    2.  Hypothyroidism, unspecified type    3. Malnutrition of moderate degree    4. Gastroesophageal reflux disease, esophagitis presence not specified    5. Aortic atherosclerosis    6. Mycobacterium avium infection    7. Pulmonary fibrosis    8. Chronic obstructive pulmonary disease, unspecified COPD type    9. Pancreas cyst        Plan:       Wellness examination  -     CBC auto differential; Future; Expected date: 01/14/2019  -     Comprehensive metabolic panel; Future; Expected date: 01/14/2019  -     Lipid panel; Future; Expected date: 01/14/2019  -     TSH; Future; Expected date: 01/14/2019    Hypothyroidism, unspecified type  -     levothyroxine (SYNTHROID) 75 MCG tablet; Take 1 tablet (75 mcg total) by mouth before breakfast.  Dispense: 90 tablet; Refill: 3  -     CBC auto differential; Future; Expected date: 01/14/2019  -     Comprehensive metabolic panel; Future; Expected date: 01/14/2019  -     Lipid panel; Future; Expected date: 01/14/2019  -     TSH; Future; Expected date: 01/14/2019    Malnutrition of moderate degree  -     megestrol (MEGACE) 40 MG Tab; Take 1 tablet (40 mg total) by mouth once daily.  Dispense: 90 tablet; Refill: 1    Gastroesophageal reflux disease, esophagitis presence not specified    Aortic atherosclerosis  -     CBC auto differential; Future; Expected date: 01/14/2019  -     Comprehensive metabolic panel; Future; Expected date: 01/14/2019  -     Lipid panel; Future; Expected date: 01/14/2019  -     TSH; Future; Expected date: 01/14/2019    Mycobacterium avium infection    Pulmonary fibrosis    Chronic obstructive pulmonary disease, unspecified COPD type    Pancreas cyst  -     US Abdomen Limited; Future; Expected date: 01/14/2019      Repeat u/s abd due to pancreas cyst in past. Update labs and health maintenance.  Will monitor chronic medical issues and continue current plan of care.  Again advised balanced diet and eat plenty of healthy protein.    Follow-up in about 4 months (around  5/14/2019), or if symptoms worsen or fail to improve.

## 2019-03-29 ENCOUNTER — OFFICE VISIT (OUTPATIENT)
Dept: FAMILY MEDICINE | Facility: CLINIC | Age: 79
End: 2019-03-29
Payer: MEDICARE

## 2019-03-29 VITALS
BODY MASS INDEX: 15.34 KG/M2 | SYSTOLIC BLOOD PRESSURE: 84 MMHG | OXYGEN SATURATION: 98 % | DIASTOLIC BLOOD PRESSURE: 58 MMHG | HEIGHT: 68 IN | WEIGHT: 101.19 LBS | HEART RATE: 109 BPM

## 2019-03-29 DIAGNOSIS — J42 CHRONIC BRONCHITIS, UNSPECIFIED CHRONIC BRONCHITIS TYPE: Primary | ICD-10-CM

## 2019-03-29 DIAGNOSIS — I70.0 AORTIC ATHEROSCLEROSIS: ICD-10-CM

## 2019-03-29 DIAGNOSIS — A31.0 MYCOBACTERIUM AVIUM INFECTION: ICD-10-CM

## 2019-03-29 DIAGNOSIS — E03.8 OTHER SPECIFIED HYPOTHYROIDISM: ICD-10-CM

## 2019-03-29 DIAGNOSIS — E44.0 MALNUTRITION OF MODERATE DEGREE: ICD-10-CM

## 2019-03-29 PROCEDURE — 99499 UNLISTED E&M SERVICE: CPT | Mod: S$GLB,,, | Performed by: FAMILY MEDICINE

## 2019-03-29 PROCEDURE — 99999 PR PBB SHADOW E&M-EST. PATIENT-LVL III: ICD-10-PCS | Mod: PBBFAC,,, | Performed by: FAMILY MEDICINE

## 2019-03-29 PROCEDURE — 99214 PR OFFICE/OUTPT VISIT, EST, LEVL IV, 30-39 MIN: ICD-10-PCS | Mod: S$GLB,,, | Performed by: FAMILY MEDICINE

## 2019-03-29 PROCEDURE — 99499 RISK ADDL DX/OHS AUDIT: ICD-10-PCS | Mod: S$GLB,,, | Performed by: FAMILY MEDICINE

## 2019-03-29 PROCEDURE — 99999 PR PBB SHADOW E&M-EST. PATIENT-LVL III: CPT | Mod: PBBFAC,,, | Performed by: FAMILY MEDICINE

## 2019-03-29 PROCEDURE — 99214 OFFICE O/P EST MOD 30 MIN: CPT | Mod: S$GLB,,, | Performed by: FAMILY MEDICINE

## 2019-03-29 PROCEDURE — 1101F PT FALLS ASSESS-DOCD LE1/YR: CPT | Mod: CPTII,S$GLB,, | Performed by: FAMILY MEDICINE

## 2019-03-29 PROCEDURE — 1101F PR PT FALLS ASSESS DOC 0-1 FALLS W/OUT INJ PAST YR: ICD-10-PCS | Mod: CPTII,S$GLB,, | Performed by: FAMILY MEDICINE

## 2019-03-29 NOTE — PROGRESS NOTES
Subjective:       Patient ID: Leonarda Almazan is a 78 y.o. male.    Chief Complaint: Cough and COPD    Here for worse cough x 3 days. Has copd but feels this cough is similar but today getting better. No fever.    Cough   This is a chronic problem. The current episode started more than 1 year ago. The problem has been unchanged. The problem occurs every few minutes. The cough is productive of brown sputum. Associated symptoms include myalgias, rhinorrhea and weight loss. Pertinent negatives include no chest pain, chills, fever, rash or shortness of breath. The symptoms are aggravated by cold air, dust and lying down. He has tried nothing for the symptoms. The treatment provided no relief. His past medical history is significant for bronchitis, COPD and pneumonia. There is no history of asthma, emphysema or environmental allergies.     Review of Systems   Constitutional: Positive for weight loss. Negative for chills, fatigue and fever.   HENT: Positive for rhinorrhea.    Respiratory: Positive for cough. Negative for chest tightness and shortness of breath.    Cardiovascular: Negative for chest pain, palpitations and leg swelling.   Endocrine: Negative for cold intolerance and heat intolerance.   Musculoskeletal: Positive for myalgias.   Skin: Negative for rash.   Allergic/Immunologic: Negative for environmental allergies.   Psychiatric/Behavioral: Negative for dysphoric mood. The patient is not nervous/anxious.        Objective:      Physical Exam   Constitutional: He appears well-developed and well-nourished.   HENT:   Head: Normocephalic and atraumatic.   Cardiovascular: Normal rate, regular rhythm and normal heart sounds.   Pulmonary/Chest: Effort normal and breath sounds normal.   Psychiatric: He has a normal mood and affect.   Nursing note and vitals reviewed.      Assessment:       1. Chronic bronchitis, unspecified chronic bronchitis type    2. Mycobacterium avium infection    3. Other specified hypothyroidism    4.  Aortic atherosclerosis    5. Malnutrition of moderate degree        Plan:       Chronic bronchitis, unspecified chronic bronchitis type  -     CBC auto differential; Future; Expected date: 03/29/2019  -     Comprehensive metabolic panel; Future; Expected date: 03/29/2019  -     Lipid panel; Future; Expected date: 03/29/2019  -     TSH; Future; Expected date: 03/29/2019    Mycobacterium avium infection  -     Culture, Respiratory with Gram Stain    Other specified hypothyroidism  -     CBC auto differential; Future; Expected date: 03/29/2019  -     Comprehensive metabolic panel; Future; Expected date: 03/29/2019  -     Lipid panel; Future; Expected date: 03/29/2019  -     TSH; Future; Expected date: 03/29/2019    Aortic atherosclerosis  -     CBC auto differential; Future; Expected date: 03/29/2019  -     Comprehensive metabolic panel; Future; Expected date: 03/29/2019  -     Lipid panel; Future; Expected date: 03/29/2019  -     TSH; Future; Expected date: 03/29/2019    Malnutrition of moderate degree      He requests sputum culture as he has been unable to produce in past but may be able to now.  Follow up with pulmonary, Dr. Johnson as planned.  He elects no treatment at this time.  Advised he needs to eat more consistently.    Follow up in about 4 months (around 7/29/2019), or if symptoms worsen or fail to improve.

## 2019-07-22 ENCOUNTER — PATIENT OUTREACH (OUTPATIENT)
Dept: ADMINISTRATIVE | Facility: HOSPITAL | Age: 79
End: 2019-07-22

## 2019-07-22 NOTE — PROGRESS NOTES
Health Maintenance Due   Topic Date Due    TETANUS VACCINE  06/26/1958     Chart review completed 07/22/2019  Future Appointments   Date Time Provider Department Center   7/25/2019  9:10 AM LAB, [place] [facility] [place]   8/5/2019  3:00 PM [place] Blake MD [facility] [place]

## 2019-07-25 ENCOUNTER — LAB VISIT (OUTPATIENT)
Dept: LAB | Facility: HOSPITAL | Age: 79
End: 2019-07-25
Attending: FAMILY MEDICINE
Payer: MEDICARE

## 2019-07-25 DIAGNOSIS — I70.0 AORTIC ATHEROSCLEROSIS: ICD-10-CM

## 2019-07-25 DIAGNOSIS — J42 CHRONIC BRONCHITIS, UNSPECIFIED CHRONIC BRONCHITIS TYPE: ICD-10-CM

## 2019-07-25 DIAGNOSIS — E03.8 OTHER SPECIFIED HYPOTHYROIDISM: ICD-10-CM

## 2019-07-25 LAB
ALBUMIN SERPL BCP-MCNC: 3.6 G/DL (ref 3.5–5.2)
ALP SERPL-CCNC: 86 U/L (ref 55–135)
ALT SERPL W/O P-5'-P-CCNC: 10 U/L (ref 10–44)
ANION GAP SERPL CALC-SCNC: 6 MMOL/L (ref 8–16)
AST SERPL-CCNC: 20 U/L (ref 10–40)
BASOPHILS # BLD AUTO: 0.03 K/UL (ref 0–0.2)
BASOPHILS NFR BLD: 0.5 % (ref 0–1.9)
BILIRUB SERPL-MCNC: 0.7 MG/DL (ref 0.1–1)
BUN SERPL-MCNC: 17 MG/DL (ref 8–23)
CALCIUM SERPL-MCNC: 9.8 MG/DL (ref 8.7–10.5)
CHLORIDE SERPL-SCNC: 102 MMOL/L (ref 95–110)
CHOLEST SERPL-MCNC: 170 MG/DL (ref 120–199)
CHOLEST/HDLC SERPL: 3.6 {RATIO} (ref 2–5)
CO2 SERPL-SCNC: 29 MMOL/L (ref 23–29)
CREAT SERPL-MCNC: 0.8 MG/DL (ref 0.5–1.4)
DIFFERENTIAL METHOD: ABNORMAL
EOSINOPHIL # BLD AUTO: 0.2 K/UL (ref 0–0.5)
EOSINOPHIL NFR BLD: 2.4 % (ref 0–8)
ERYTHROCYTE [DISTWIDTH] IN BLOOD BY AUTOMATED COUNT: 12.7 % (ref 11.5–14.5)
EST. GFR  (AFRICAN AMERICAN): >60 ML/MIN/1.73 M^2
EST. GFR  (NON AFRICAN AMERICAN): >60 ML/MIN/1.73 M^2
GLUCOSE SERPL-MCNC: 92 MG/DL (ref 70–110)
HCT VFR BLD AUTO: 37.5 % (ref 40–54)
HDLC SERPL-MCNC: 47 MG/DL (ref 40–75)
HDLC SERPL: 27.6 % (ref 20–50)
HGB BLD-MCNC: 11.8 G/DL (ref 14–18)
IMM GRANULOCYTES # BLD AUTO: 0.01 K/UL (ref 0–0.04)
IMM GRANULOCYTES NFR BLD AUTO: 0.2 % (ref 0–0.5)
LDLC SERPL CALC-MCNC: 105.6 MG/DL (ref 63–159)
LYMPHOCYTES # BLD AUTO: 1.6 K/UL (ref 1–4.8)
LYMPHOCYTES NFR BLD: 23.7 % (ref 18–48)
MCH RBC QN AUTO: 31.3 PG (ref 27–31)
MCHC RBC AUTO-ENTMCNC: 31.5 G/DL (ref 32–36)
MCV RBC AUTO: 100 FL (ref 82–98)
MONOCYTES # BLD AUTO: 0.5 K/UL (ref 0.3–1)
MONOCYTES NFR BLD: 8 % (ref 4–15)
NEUTROPHILS # BLD AUTO: 4.3 K/UL (ref 1.8–7.7)
NEUTROPHILS NFR BLD: 65.2 % (ref 38–73)
NONHDLC SERPL-MCNC: 123 MG/DL
NRBC BLD-RTO: 0 /100 WBC
PLATELET # BLD AUTO: 321 K/UL (ref 150–350)
PMV BLD AUTO: 9.2 FL (ref 9.2–12.9)
POTASSIUM SERPL-SCNC: 4.3 MMOL/L (ref 3.5–5.1)
PROT SERPL-MCNC: 8 G/DL (ref 6–8.4)
RBC # BLD AUTO: 3.77 M/UL (ref 4.6–6.2)
SODIUM SERPL-SCNC: 137 MMOL/L (ref 136–145)
TRIGL SERPL-MCNC: 87 MG/DL (ref 30–150)
TSH SERPL DL<=0.005 MIU/L-ACNC: 0.48 UIU/ML (ref 0.4–4)
WBC # BLD AUTO: 6.63 K/UL (ref 3.9–12.7)

## 2019-07-25 PROCEDURE — 85025 COMPLETE CBC W/AUTO DIFF WBC: CPT

## 2019-07-25 PROCEDURE — 36415 COLL VENOUS BLD VENIPUNCTURE: CPT | Mod: PO

## 2019-07-25 PROCEDURE — 80053 COMPREHEN METABOLIC PANEL: CPT

## 2019-07-25 PROCEDURE — 80061 LIPID PANEL: CPT

## 2019-07-25 PROCEDURE — 84443 ASSAY THYROID STIM HORMONE: CPT

## 2019-08-05 ENCOUNTER — OFFICE VISIT (OUTPATIENT)
Dept: FAMILY MEDICINE | Facility: CLINIC | Age: 79
End: 2019-08-05
Payer: MEDICARE

## 2019-08-05 VITALS — SYSTOLIC BLOOD PRESSURE: 96 MMHG | DIASTOLIC BLOOD PRESSURE: 58 MMHG

## 2019-08-05 DIAGNOSIS — B35.1 TOENAIL FUNGUS: ICD-10-CM

## 2019-08-05 DIAGNOSIS — J42 CHRONIC BRONCHITIS, UNSPECIFIED CHRONIC BRONCHITIS TYPE: ICD-10-CM

## 2019-08-05 DIAGNOSIS — R93.2 ABNORMAL FINDINGS ON DIAGNOSTIC IMAGING OF LIVER AND BILIARY TRACT: ICD-10-CM

## 2019-08-05 DIAGNOSIS — A31.9 MYCOBACTERIAL INFECTION, ATYPICAL: ICD-10-CM

## 2019-08-05 DIAGNOSIS — K86.89 PANCREATIC MASS: ICD-10-CM

## 2019-08-05 DIAGNOSIS — Z00.00 WELLNESS EXAMINATION: Primary | ICD-10-CM

## 2019-08-05 PROCEDURE — 99999 PR PBB SHADOW E&M-EST. PATIENT-LVL V: CPT | Mod: PBBFAC,,, | Performed by: FAMILY MEDICINE

## 2019-08-05 PROCEDURE — 99213 PR OFFICE/OUTPT VISIT, EST, LEVL III, 20-29 MIN: ICD-10-PCS | Mod: S$GLB,,, | Performed by: FAMILY MEDICINE

## 2019-08-05 PROCEDURE — 99499 RISK ADDL DX/OHS AUDIT: ICD-10-PCS | Mod: S$GLB,,, | Performed by: FAMILY MEDICINE

## 2019-08-05 PROCEDURE — 99499 UNLISTED E&M SERVICE: CPT | Mod: S$GLB,,, | Performed by: FAMILY MEDICINE

## 2019-08-05 PROCEDURE — 99213 OFFICE O/P EST LOW 20 MIN: CPT | Mod: S$GLB,,, | Performed by: FAMILY MEDICINE

## 2019-08-05 PROCEDURE — 99999 PR PBB SHADOW E&M-EST. PATIENT-LVL V: ICD-10-PCS | Mod: PBBFAC,,, | Performed by: FAMILY MEDICINE

## 2019-08-05 RX ORDER — TERBINAFINE HYDROCHLORIDE 250 MG/1
250 TABLET ORAL DAILY
Qty: 90 TABLET | Refills: 0 | Status: SHIPPED | OUTPATIENT
Start: 2019-08-05 | End: 2019-09-04

## 2019-08-05 RX ORDER — TRIAMCINOLONE ACETONIDE 1 MG/G
CREAM TOPICAL 2 TIMES DAILY PRN
Qty: 45 G | Refills: 0 | Status: SHIPPED | OUTPATIENT
Start: 2019-08-05 | End: 2019-12-05

## 2019-08-05 NOTE — PROGRESS NOTES
Subjective:       Patient ID: Leonarda Almazan is a 79 y.o. male.    Chief Complaint: Follow-up (4 month)    Here for wellness and f/u chronic medical issues. Would like to f/u with pulmonary regarding chronic lung issues which I think is a good idea.    Review of Systems   Constitutional: Negative for chills and fever.   Respiratory: Negative for cough, chest tightness and shortness of breath.    Cardiovascular: Negative for chest pain, palpitations and leg swelling.   Endocrine: Negative for cold intolerance and heat intolerance.   Skin: Negative for rash.       Objective:      Physical Exam   Constitutional: He appears well-developed and well-nourished.   HENT:   Head: Normocephalic and atraumatic.   Cardiovascular: Normal rate, regular rhythm and normal heart sounds.   Pulmonary/Chest: Effort normal and breath sounds normal.   Psychiatric: He has a normal mood and affect. His behavior is normal.   Nursing note and vitals reviewed.      Assessment:       1. Wellness examination    2. Toenail fungus    3. Chronic bronchitis, unspecified chronic bronchitis type    4. Pancreatic mass    5. Abnormal findings on diagnostic imaging of liver and biliary tract     6. Mycobacterial infection, atypical        Plan:       Wellness examination    Toenail fungus    Chronic bronchitis, unspecified chronic bronchitis type  -     Ambulatory referral to Pulmonology    Pancreatic mass  -     MRI MRCP Without Contrast; Future; Expected date: 08/05/2019    Abnormal findings on diagnostic imaging of liver and biliary tract   -     MRI MRCP Without Contrast; Future; Expected date: 08/05/2019    Mycobacterial infection, atypical  -     Cancel: Ambulatory referral to Pulmonology  -     CT Chest Without Contrast; Future; Expected date: 08/05/2019  -     Ambulatory referral to Pulmonology    Other orders  -     terbinafine HCl (LAMISIL) 250 mg tablet; Take 1 tablet (250 mg total) by mouth once daily.  Dispense: 90 tablet; Refill: 0  -      triamcinolone acetonide 0.1% (KENALOG) 0.1 % cream; Apply topically 2 (two) times daily as needed.  Dispense: 45 g; Refill: 0      Patient would like to f/u with pulmonary as he is questioning if he needs abx again. Last seen about 2 years ago.  After dicussion he would like more testing regarding pancreatic findings in January.  Med for nail fungus which he had success with in the past.  Follow up in about 6 months (around 2/5/2020), or if symptoms worsen or fail to improve.

## 2019-09-16 ENCOUNTER — HOSPITAL ENCOUNTER (OUTPATIENT)
Dept: RADIOLOGY | Facility: HOSPITAL | Age: 79
Discharge: HOME | End: 2019-09-16
Attending: FAMILY MEDICINE
Payer: MEDICARE

## 2019-09-16 DIAGNOSIS — A31.9 MYCOBACTERIAL INFECTION, ATYPICAL: ICD-10-CM

## 2019-09-16 PROCEDURE — 71250 CT THORAX DX C-: CPT | Mod: TC,PO

## 2019-09-16 PROCEDURE — 71250 CT THORAX DX C-: CPT | Mod: 26,,, | Performed by: RADIOLOGY

## 2019-09-16 PROCEDURE — 71250 CT CHEST WITHOUT CONTRAST: ICD-10-PCS | Mod: 26,,, | Performed by: RADIOLOGY

## 2019-09-18 ENCOUNTER — HOSPITAL ENCOUNTER (OUTPATIENT)
Dept: RADIOLOGY | Facility: HOSPITAL | Age: 79
Discharge: HOME | End: 2019-09-18
Attending: FAMILY MEDICINE
Payer: MEDICARE

## 2019-09-18 DIAGNOSIS — R93.2 ABNORMAL FINDINGS ON DIAGNOSTIC IMAGING OF LIVER AND BILIARY TRACT: ICD-10-CM

## 2019-09-18 DIAGNOSIS — K86.89 PANCREATIC MASS: ICD-10-CM

## 2019-09-18 PROCEDURE — 76376 3D RENDER W/INTRP POSTPROCES: CPT | Mod: TC,PO

## 2019-09-18 PROCEDURE — 74181 MRI ABDOMEN W/O CONTRAST: CPT | Mod: 26,,, | Performed by: RADIOLOGY

## 2019-09-18 PROCEDURE — 76376 3D RENDER W/INTRP POSTPROCES: CPT | Mod: 26,,, | Performed by: RADIOLOGY

## 2019-09-18 PROCEDURE — 74181 MRI ABDOMEN W/O CONTRAST: CPT | Mod: TC,PO

## 2019-09-18 PROCEDURE — 74181 MRI ABDOMEN WITHOUT CONTRAST MRCP: ICD-10-PCS | Mod: 26,,, | Performed by: RADIOLOGY

## 2019-09-18 PROCEDURE — 76376 MRI ABDOMEN WITHOUT CONTRAST MRCP: ICD-10-PCS | Mod: 26,,, | Performed by: RADIOLOGY

## 2019-10-08 ENCOUNTER — PATIENT OUTREACH (OUTPATIENT)
Dept: ADMINISTRATIVE | Facility: HOSPITAL | Age: 79
End: 2019-10-08

## 2019-11-19 ENCOUNTER — TELEPHONE (OUTPATIENT)
Dept: FAMILY MEDICINE | Facility: CLINIC | Age: 79
End: 2019-11-19

## 2019-11-19 NOTE — TELEPHONE ENCOUNTER
----- Message from Alexandria Huddleston sent at 11/19/2019  1:33 PM CST -----  Contact: Pt  Type: Needs Medical Advice    Who Called:  PT  Best Call Back Number: [phone]  Additional Information: Requesting a call back regarding pt having dizziness and the room spines and also nausea   Please Advise ---Thank you

## 2019-11-20 ENCOUNTER — OFFICE VISIT (OUTPATIENT)
Dept: FAMILY MEDICINE | Facility: CLINIC | Age: 79
End: 2019-11-20
Payer: MEDICARE

## 2019-11-20 ENCOUNTER — TELEPHONE (OUTPATIENT)
Dept: FAMILY MEDICINE | Facility: CLINIC | Age: 79
End: 2019-11-20

## 2019-11-20 VITALS
OXYGEN SATURATION: 97 % | SYSTOLIC BLOOD PRESSURE: 100 MMHG | HEART RATE: 94 BPM | HEIGHT: 68 IN | WEIGHT: 104.06 LBS | BODY MASS INDEX: 15.77 KG/M2 | DIASTOLIC BLOOD PRESSURE: 60 MMHG

## 2019-11-20 DIAGNOSIS — R55 SYNCOPE, NEAR: ICD-10-CM

## 2019-11-20 DIAGNOSIS — K86.9 LESION OF PANCREAS: Primary | ICD-10-CM

## 2019-11-20 DIAGNOSIS — R42 VERTIGO: Primary | ICD-10-CM

## 2019-11-20 PROCEDURE — 99214 OFFICE O/P EST MOD 30 MIN: CPT | Mod: 25,S$GLB,, | Performed by: PHYSICIAN ASSISTANT

## 2019-11-20 PROCEDURE — 90714 TD VACC NO PRESV 7 YRS+ IM: CPT | Mod: S$GLB,,, | Performed by: PHYSICIAN ASSISTANT

## 2019-11-20 PROCEDURE — 99999 PR PBB SHADOW E&M-EST. PATIENT-LVL IV: CPT | Mod: PBBFAC,,, | Performed by: PHYSICIAN ASSISTANT

## 2019-11-20 PROCEDURE — 90714 TD VACCINE GREATER THAN OR EQUAL TO 7YO PRESERVATIVE FREE IM: ICD-10-PCS | Mod: S$GLB,,, | Performed by: PHYSICIAN ASSISTANT

## 2019-11-20 PROCEDURE — 1101F PR PT FALLS ASSESS DOC 0-1 FALLS W/OUT INJ PAST YR: ICD-10-PCS | Mod: CPTII,S$GLB,, | Performed by: PHYSICIAN ASSISTANT

## 2019-11-20 PROCEDURE — 99214 PR OFFICE/OUTPT VISIT, EST, LEVL IV, 30-39 MIN: ICD-10-PCS | Mod: 25,S$GLB,, | Performed by: PHYSICIAN ASSISTANT

## 2019-11-20 PROCEDURE — 99999 PR PBB SHADOW E&M-EST. PATIENT-LVL IV: ICD-10-PCS | Mod: PBBFAC,,, | Performed by: PHYSICIAN ASSISTANT

## 2019-11-20 PROCEDURE — 1159F PR MEDICATION LIST DOCUMENTED IN MEDICAL RECORD: ICD-10-PCS | Mod: S$GLB,,, | Performed by: PHYSICIAN ASSISTANT

## 2019-11-20 PROCEDURE — 1126F AMNT PAIN NOTED NONE PRSNT: CPT | Mod: S$GLB,,, | Performed by: PHYSICIAN ASSISTANT

## 2019-11-20 PROCEDURE — 1101F PT FALLS ASSESS-DOCD LE1/YR: CPT | Mod: CPTII,S$GLB,, | Performed by: PHYSICIAN ASSISTANT

## 2019-11-20 PROCEDURE — 90471 IMMUNIZATION ADMIN: CPT | Mod: S$GLB,,, | Performed by: PHYSICIAN ASSISTANT

## 2019-11-20 PROCEDURE — 1159F MED LIST DOCD IN RCRD: CPT | Mod: S$GLB,,, | Performed by: PHYSICIAN ASSISTANT

## 2019-11-20 PROCEDURE — 1126F PR PAIN SEVERITY QUANTIFIED, NO PAIN PRESENT: ICD-10-PCS | Mod: S$GLB,,, | Performed by: PHYSICIAN ASSISTANT

## 2019-11-20 PROCEDURE — 90471 TD VACCINE GREATER THAN OR EQUAL TO 7YO PRESERVATIVE FREE IM: ICD-10-PCS | Mod: S$GLB,,, | Performed by: PHYSICIAN ASSISTANT

## 2019-11-20 RX ORDER — PREDNISONE 10 MG/1
TABLET ORAL
Qty: 18 TABLET | Refills: 0 | Status: SHIPPED | OUTPATIENT
Start: 2019-11-20 | End: 2019-12-05 | Stop reason: ALTCHOICE

## 2019-11-20 RX ORDER — LEVOCETIRIZINE DIHYDROCHLORIDE 5 MG/1
5 TABLET, FILM COATED ORAL NIGHTLY
Qty: 90 TABLET | Refills: 1 | Status: SHIPPED | OUTPATIENT
Start: 2019-11-20 | End: 2021-03-01

## 2019-11-20 NOTE — PROGRESS NOTES
Subjective:       Patient ID: Leonarda Almazan is a 79 y.o. male.    Chief Complaint: Dizziness (patient worried it could be warning sign of stroke) and Nausea    Dizziness:   Chronicity:  New  Onset:  In the past 7 days  Progression since onset:  Waxing and waning  Severity:  Mild  Duration:  Very brief  Dizziness characteristics:  Off-balance   Associated symptoms: ear congestion, headaches, nausea, vomiting, weakness and palpitations.no fever and no chest pain.  Aggravated by:  Position changes  Treatments tried:  Nothing    Past Medical History:   Diagnosis Date    Anemia 2/19/2013    Bronchiectasis without acute exacerbation     Colon polyp     Cough     Elevated PSA     GERD (gastroesophageal reflux disease)     on herbal med    Herpes zoster w/ nervous system complication 2012    still has some pain, takes no meds    Hypothyroidism     Lung nodules     followed by Dr. Montoya    Post herpetic neuralgia        Review of Systems   Constitutional: Positive for appetite change and unexpected weight change. Negative for activity change, chills, fatigue and fever.   HENT: Positive for congestion, postnasal drip and rhinorrhea. Negative for sinus pressure, sinus pain and sore throat.    Eyes: Negative for discharge.   Respiratory: Negative for wheezing.    Cardiovascular: Positive for palpitations. Negative for chest pain.   Gastrointestinal: Positive for nausea and vomiting. Negative for constipation and diarrhea.   Genitourinary: Negative for difficulty urinating and hematuria.   Neurological: Positive for dizziness, weakness and headaches.   Psychiatric/Behavioral: Negative for dysphoric mood.       Objective:      Physical Exam   Constitutional: He appears well-developed and well-nourished. No distress.   HENT:   Head: Normocephalic and atraumatic.   Right Ear: Tympanic membrane and ear canal normal.   Left Ear: Tympanic membrane and ear canal normal.   Nose: Mucosal edema and rhinorrhea present. Right sinus  exhibits no maxillary sinus tenderness and no frontal sinus tenderness. Left sinus exhibits no maxillary sinus tenderness and no frontal sinus tenderness.   Mouth/Throat: Uvula is midline and mucous membranes are normal. No tonsillar exudate.   Neck: Neck supple.   Cardiovascular: Normal rate and regular rhythm. Exam reveals no gallop and no friction rub.   No murmur heard.  Pulmonary/Chest: Effort normal and breath sounds normal. No stridor. No respiratory distress. He has no wheezes. He has no rales. He exhibits no tenderness.   Lymphadenopathy:     He has no cervical adenopathy.   Neurological: He is alert. He is not disoriented. No cranial nerve deficit or sensory deficit. He exhibits normal muscle tone. Coordination and gait normal.   Skin: He is not diaphoretic.   Nursing note and vitals reviewed.      Assessment:       1. Vertigo    2. Syncope, near        Plan:       Vertigo  -     US Carotid Bilateral; Future; Expected date: 11/20/2019  -     predniSONE (DELTASONE) 10 MG tablet; Take 3 daily for 3 days, then 2 daily for three days, then 1 daily for three days.  Dispense: 18 tablet; Refill: 0  -     levocetirizine (XYZAL) 5 MG tablet; Take 1 tablet (5 mg total) by mouth every evening.  Dispense: 90 tablet; Refill: 1    Syncope, near  -     US Carotid Bilateral; Future; Expected date: 11/20/2019    Other orders  -     (In Office Administered) Td Vaccine - Preservative Free

## 2019-11-20 NOTE — PATIENT INSTRUCTIONS
Managing Dizziness (Vertigo) with Medicines    Although medicines can't cure your problem, they can help control symptoms. Your doctor may prescribe medicines for a few weeks and then taper them off. Always take your medicine as prescribed. Never share your medicine with others.  Contact your healthcare provider right away if you have side effects from your medicines.   How medicines can help  · Treat infection or inflammation. If you have an infection caused by bacteria, your doctor can prescribe antibiotics.  · Limit conflicting balance signals. These medicines are often in pill form.  · Ease nausea. Suppositories, pills, or shots can reduce vomiting.  · Reduce pressure in the canals. Diuretics can be used to treat Meniere's disease. These medicines help your body get rid of extra fluid.  · Ease other symptoms. Other medicines can help ease depression and anxiety caused by living with dizziness or fainting.  Date Last Reviewed: 11/1/2016  © 7531-5365 [employer]. [address] [place] [address], [place] [address]. All rights reserved. This information is not intended as a substitute for professional medical care. Always follow your healthcare professional's instructions.

## 2019-11-20 NOTE — TELEPHONE ENCOUNTER
----- Message from AUTUMN Blake MD sent at 11/20/2019 11:41 AM CST -----  I see that he was seen in clinic today for dizziness.  I would like to update a ct of his abdomen as well as the ultrasound that was scheduled.    Thank you.

## 2019-11-21 ENCOUNTER — HOSPITAL ENCOUNTER (OUTPATIENT)
Dept: RADIOLOGY | Facility: HOSPITAL | Age: 79
Discharge: HOME | End: 2019-11-21
Attending: PHYSICIAN ASSISTANT
Payer: MEDICARE

## 2019-11-21 ENCOUNTER — HOSPITAL ENCOUNTER (OUTPATIENT)
Dept: RADIOLOGY | Facility: HOSPITAL | Age: 79
Discharge: HOME | End: 2019-11-21
Attending: FAMILY MEDICINE
Payer: MEDICARE

## 2019-11-21 DIAGNOSIS — R55 SYNCOPE, NEAR: ICD-10-CM

## 2019-11-21 DIAGNOSIS — R42 VERTIGO: ICD-10-CM

## 2019-11-21 DIAGNOSIS — K86.9 LESION OF PANCREAS: ICD-10-CM

## 2019-11-21 PROCEDURE — 74160 CT ABDOMEN W/CONTRAST: CPT | Mod: 26,,, | Performed by: RADIOLOGY

## 2019-11-21 PROCEDURE — 74160 CT ABDOMEN WITH CONTRAST: ICD-10-PCS | Mod: 26,,, | Performed by: RADIOLOGY

## 2019-11-21 PROCEDURE — 74160 CT ABDOMEN W/CONTRAST: CPT | Mod: TC,PO

## 2019-11-21 PROCEDURE — 93880 EXTRACRANIAL BILAT STUDY: CPT | Mod: 26,,, | Performed by: RADIOLOGY

## 2019-11-21 PROCEDURE — 25500020 PHARM REV CODE 255: Mod: PO | Performed by: FAMILY MEDICINE

## 2019-11-21 PROCEDURE — 93880 US CAROTID BILATERAL: ICD-10-PCS | Mod: 26,,, | Performed by: RADIOLOGY

## 2019-11-21 PROCEDURE — 93880 EXTRACRANIAL BILAT STUDY: CPT | Mod: TC,PO

## 2019-11-21 RX ADMIN — IOHEXOL 75 ML: 350 INJECTION, SOLUTION INTRAVENOUS at 11:11

## 2019-12-05 ENCOUNTER — OFFICE VISIT (OUTPATIENT)
Dept: FAMILY MEDICINE | Facility: CLINIC | Age: 79
End: 2019-12-05
Payer: MEDICARE

## 2019-12-05 VITALS
BODY MASS INDEX: 15.94 KG/M2 | SYSTOLIC BLOOD PRESSURE: 100 MMHG | DIASTOLIC BLOOD PRESSURE: 56 MMHG | HEART RATE: 80 BPM | HEIGHT: 68 IN | WEIGHT: 105.19 LBS

## 2019-12-05 DIAGNOSIS — D49.0 PANCREAS NEOPLASM: ICD-10-CM

## 2019-12-05 DIAGNOSIS — A31.0 MYCOBACTERIUM AVIUM INFECTION: ICD-10-CM

## 2019-12-05 DIAGNOSIS — C25.9 PRIMARY PANCREATIC CANCER: Primary | ICD-10-CM

## 2019-12-05 PROCEDURE — 99999 PR PBB SHADOW E&M-EST. PATIENT-LVL IV: CPT | Mod: PBBFAC,,, | Performed by: PHYSICIAN ASSISTANT

## 2019-12-05 PROCEDURE — 1126F AMNT PAIN NOTED NONE PRSNT: CPT | Mod: S$GLB,,, | Performed by: PHYSICIAN ASSISTANT

## 2019-12-05 PROCEDURE — 1101F PT FALLS ASSESS-DOCD LE1/YR: CPT | Mod: CPTII,S$GLB,, | Performed by: PHYSICIAN ASSISTANT

## 2019-12-05 PROCEDURE — 99499 UNLISTED E&M SERVICE: CPT | Mod: S$GLB,,, | Performed by: PHYSICIAN ASSISTANT

## 2019-12-05 PROCEDURE — 99214 OFFICE O/P EST MOD 30 MIN: CPT | Mod: S$GLB,,, | Performed by: PHYSICIAN ASSISTANT

## 2019-12-05 PROCEDURE — 99999 PR PBB SHADOW E&M-EST. PATIENT-LVL IV: ICD-10-PCS | Mod: PBBFAC,,, | Performed by: PHYSICIAN ASSISTANT

## 2019-12-05 PROCEDURE — 99499 RISK ADDL DX/OHS AUDIT: ICD-10-PCS | Mod: S$GLB,,, | Performed by: PHYSICIAN ASSISTANT

## 2019-12-05 PROCEDURE — 1159F MED LIST DOCD IN RCRD: CPT | Mod: S$GLB,,, | Performed by: PHYSICIAN ASSISTANT

## 2019-12-05 PROCEDURE — 1126F PR PAIN SEVERITY QUANTIFIED, NO PAIN PRESENT: ICD-10-PCS | Mod: S$GLB,,, | Performed by: PHYSICIAN ASSISTANT

## 2019-12-05 PROCEDURE — 1101F PR PT FALLS ASSESS DOC 0-1 FALLS W/OUT INJ PAST YR: ICD-10-PCS | Mod: CPTII,S$GLB,, | Performed by: PHYSICIAN ASSISTANT

## 2019-12-05 PROCEDURE — 99214 PR OFFICE/OUTPT VISIT, EST, LEVL IV, 30-39 MIN: ICD-10-PCS | Mod: S$GLB,,, | Performed by: PHYSICIAN ASSISTANT

## 2019-12-05 PROCEDURE — 1159F PR MEDICATION LIST DOCUMENTED IN MEDICAL RECORD: ICD-10-PCS | Mod: S$GLB,,, | Performed by: PHYSICIAN ASSISTANT

## 2019-12-05 NOTE — PROGRESS NOTES
Subjective:       Patient ID: Leonarda Almazan is a 79 y.o. male.    Chief Complaint: Follow-up (CT results per Hayden)    Patient is an 78 yo male here to follow up from a CT of the abdomen. He was found to have.     Impression      1. 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  2. Left lower lobe fibrosis/bronchiectasis.    Out side of having cachexia,  He is asymptomatic. Dr Blake discussed the findings with radiology and this is not generally an aggressive cancer.     Past Medical History:  2/19/2013: Anemia  No date: Bronchiectasis without acute exacerbation  No date: Colon polyp  No date: Cough  No date: Elevated PSA  No date: GERD (gastroesophageal reflux disease)      Comment:  on herbal med  2012: Herpes zoster w/ nervous system complication      Comment:  still has some pain, takes no meds  No date: Hypothyroidism  No date: Lung nodules      Comment:  followed by Dr. Montoya  No date: Post herpetic neuralgia      He has not seen his pulmonologist in quiet some time.     Review of Systems   Constitutional: Negative for activity change, fatigue and fever.   Respiratory: Negative for chest tightness and shortness of breath.    Cardiovascular: Negative for chest pain.   Gastrointestinal: Negative for abdominal distention, abdominal pain, blood in stool, constipation, diarrhea, nausea and vomiting.       Objective:      Physical Exam   Constitutional: He appears well-developed. He appears cachectic. No distress.   HENT:   Head: Normocephalic and atraumatic.   Neck: Neck supple.   Cardiovascular: Normal rate and regular rhythm. Exam reveals no gallop and no friction rub.   No murmur heard.  Pulmonary/Chest: Effort normal and breath sounds normal. No stridor. No respiratory distress. He has no wheezes. He has no rales. He exhibits no tenderness.   Abdominal: Soft. Bowel sounds  are normal. There is no tenderness. There is no guarding.   Lymphadenopathy:     He has no cervical adenopathy.   Skin: He is not diaphoretic.   Nursing note and vitals reviewed.      Assessment:       1. Primary pancreatic cancer     2. Mycobacterium avium infection    3. Pancreas neoplasm        Plan:       Primary pancreatic cancer   -     Ambulatory referral to General Surgery  I explained to the patient that surgery at his physical state carried a great risk, but an opinion from surgery would be a good option.     Mycobacterium avium infection  -     Ambulatory consult to Pulmonology    Pancreas neoplasm  -  If observation is a option then:    CT Abdomen Pelvis W Wo Contrast; Future; Expected date: 06/05/2020

## 2019-12-11 ENCOUNTER — TELEPHONE (OUTPATIENT)
Dept: FAMILY MEDICINE | Facility: CLINIC | Age: 79
End: 2019-12-11

## 2019-12-11 DIAGNOSIS — C25.9 MALIGNANT NEOPLASM OF PANCREAS, UNSPECIFIED LOCATION OF MALIGNANCY: ICD-10-CM

## 2019-12-11 DIAGNOSIS — C25.9 PRIMARY PANCREATIC CANCER: Primary | ICD-10-CM

## 2019-12-11 NOTE — TELEPHONE ENCOUNTER
----- Message from Jesus Monique MA sent at 12/11/2019  3:36 PM CST -----  Regarding: Appointment on 1/8/20 at 2pm  To Dr. Rothman's staff,   This patient was scheduled with Dr. Fitzpatrick for the treatment of Pancreatic cancer.  Dr. Fitzpatrick doesn't treat the Pancreas.  He's a General/Colon-rectal surgeron.  Please call the patient to reschedule with Surgical Oncology.  Thanks,  Jesus

## 2019-12-12 ENCOUNTER — TELEPHONE (OUTPATIENT)
Dept: FAMILY MEDICINE | Facility: CLINIC | Age: 79
End: 2019-12-12

## 2019-12-12 ENCOUNTER — PATIENT MESSAGE (OUTPATIENT)
Dept: PRIMARY CARE CLINIC | Facility: CLINIC | Age: 79
End: 2019-12-12

## 2019-12-12 NOTE — TELEPHONE ENCOUNTER
Reached out to hem/oc, they recommend the patient start with our hem/oc dept and go from there. Please place referral for hem/oc

## 2019-12-12 NOTE — TELEPHONE ENCOUNTER
----- Message from Cherry Hensley sent at 12/12/2019  1:49 PM CST -----  Contact: patient  Type:  Patient Returning Call    Who Called:  patient  Who Left Message for Patient:  adam  Does the patient know what this is regarding?:  yes  Best Call Back Number:  [phone]  Additional Information:  Adam beatriz will not come up on IM ,please call to advise.Chepe!

## 2019-12-16 ENCOUNTER — INITIAL CONSULT (OUTPATIENT)
Dept: HEMATOLOGY/ONCOLOGY | Facility: CLINIC | Age: 79
End: 2019-12-16
Payer: MEDICARE

## 2019-12-16 ENCOUNTER — LAB VISIT (OUTPATIENT)
Dept: LAB | Facility: HOSPITAL | Age: 79
End: 2019-12-16
Attending: INTERNAL MEDICINE
Payer: MEDICARE

## 2019-12-16 VITALS
DIASTOLIC BLOOD PRESSURE: 54 MMHG | SYSTOLIC BLOOD PRESSURE: 83 MMHG | HEIGHT: 67 IN | BODY MASS INDEX: 16.44 KG/M2 | OXYGEN SATURATION: 96 % | TEMPERATURE: 98 F | RESPIRATION RATE: 16 BRPM | HEART RATE: 83 BPM | WEIGHT: 104.75 LBS

## 2019-12-16 DIAGNOSIS — K86.2 CYSTIC MASS OF PANCREAS: Primary | ICD-10-CM

## 2019-12-16 DIAGNOSIS — K86.2 CYSTIC MASS OF PANCREAS: ICD-10-CM

## 2019-12-16 DIAGNOSIS — D37.9 NEOPLASM OF UNCERTAIN BEHAVIOR OF DIGESTIVE ORGAN, UNSPECIFIED: ICD-10-CM

## 2019-12-16 LAB
ALBUMIN SERPL BCP-MCNC: 4.2 G/DL (ref 3.5–5.2)
ALP SERPL-CCNC: 84 U/L (ref 38–145)
ALT SERPL W/O P-5'-P-CCNC: 16 U/L (ref 10–44)
ANION GAP SERPL CALC-SCNC: 7 MMOL/L (ref 8–16)
AST SERPL-CCNC: 27 U/L (ref 17–59)
BASOPHILS # BLD AUTO: 0.02 K/UL (ref 0–0.2)
BASOPHILS NFR BLD: 0.4 % (ref 0–1.9)
BILIRUB SERPL-MCNC: 0.6 MG/DL (ref 0.2–1.3)
BUN SERPL-MCNC: 18 MG/DL (ref 9–21)
CALCIUM SERPL-MCNC: 8.9 MG/DL (ref 8.4–10.2)
CHLORIDE SERPL-SCNC: 98 MMOL/L (ref 95–110)
CO2 SERPL-SCNC: 33 MMOL/L (ref 22–31)
CREAT SERPL-MCNC: 0.68 MG/DL (ref 0.5–1.4)
DIFFERENTIAL METHOD: ABNORMAL
EOSINOPHIL # BLD AUTO: 0.1 K/UL (ref 0–0.5)
EOSINOPHIL NFR BLD: 2.5 % (ref 0–8)
ERYTHROCYTE [DISTWIDTH] IN BLOOD BY AUTOMATED COUNT: 13.2 % (ref 11.5–14.5)
EST. GFR  (AFRICAN AMERICAN): >60 ML/MIN/1.73 M^2
EST. GFR  (NON AFRICAN AMERICAN): >60 ML/MIN/1.73 M^2
GLUCOSE SERPL-MCNC: 103 MG/DL (ref 70–110)
HCT VFR BLD AUTO: 38.1 % (ref 40–54)
HGB BLD-MCNC: 12.3 G/DL (ref 14–18)
IMM GRANULOCYTES # BLD AUTO: 0 K/UL (ref 0–0.04)
IMM GRANULOCYTES NFR BLD AUTO: 0 % (ref 0–0.5)
LDH SERPL L TO P-CCNC: 392 U/L (ref 313–618)
LYMPHOCYTES # BLD AUTO: 1 K/UL (ref 1–4.8)
LYMPHOCYTES NFR BLD: 21.3 % (ref 18–48)
MAGNESIUM SERPL-MCNC: 2.1 MG/DL (ref 1.6–2.6)
MCH RBC QN AUTO: 31.2 PG (ref 27–31)
MCHC RBC AUTO-ENTMCNC: 32.3 G/DL (ref 32–36)
MCV RBC AUTO: 97 FL (ref 82–98)
MONOCYTES # BLD AUTO: 0.4 K/UL (ref 0.3–1)
MONOCYTES NFR BLD: 8.9 % (ref 4–15)
NEUTROPHILS # BLD AUTO: 3 K/UL (ref 1.8–7.7)
NEUTROPHILS NFR BLD: 66.9 % (ref 38–73)
NRBC BLD-RTO: 0 /100 WBC
PLATELET # BLD AUTO: 225 K/UL (ref 150–350)
PMV BLD AUTO: 8.9 FL (ref 9.2–12.9)
POTASSIUM SERPL-SCNC: 4.1 MMOL/L (ref 3.5–5.1)
PROT SERPL-MCNC: 8 G/DL (ref 6–8.4)
RBC # BLD AUTO: 3.94 M/UL (ref 4.6–6.2)
SODIUM SERPL-SCNC: 138 MMOL/L (ref 136–145)
WBC # BLD AUTO: 4.47 K/UL (ref 3.9–12.7)

## 2019-12-16 PROCEDURE — 99999 PR PBB SHADOW E&M-EST. PATIENT-LVL IV: CPT | Mod: PBBFAC,,, | Performed by: INTERNAL MEDICINE

## 2019-12-16 PROCEDURE — 85025 COMPLETE CBC W/AUTO DIFF WBC: CPT

## 2019-12-16 PROCEDURE — 83735 ASSAY OF MAGNESIUM: CPT

## 2019-12-16 PROCEDURE — 99205 OFFICE O/P NEW HI 60 MIN: CPT | Mod: S$GLB,,, | Performed by: INTERNAL MEDICINE

## 2019-12-16 PROCEDURE — 1101F PT FALLS ASSESS-DOCD LE1/YR: CPT | Mod: CPTII,S$GLB,, | Performed by: INTERNAL MEDICINE

## 2019-12-16 PROCEDURE — 1159F PR MEDICATION LIST DOCUMENTED IN MEDICAL RECORD: ICD-10-PCS | Mod: S$GLB,,, | Performed by: INTERNAL MEDICINE

## 2019-12-16 PROCEDURE — 99205 PR OFFICE/OUTPT VISIT, NEW, LEVL V, 60-74 MIN: ICD-10-PCS | Mod: S$GLB,,, | Performed by: INTERNAL MEDICINE

## 2019-12-16 PROCEDURE — 83615 LACTATE (LD) (LDH) ENZYME: CPT | Mod: PN

## 2019-12-16 PROCEDURE — 80053 COMPREHEN METABOLIC PANEL: CPT

## 2019-12-16 PROCEDURE — 83615 LACTATE (LD) (LDH) ENZYME: CPT

## 2019-12-16 PROCEDURE — 1101F PR PT FALLS ASSESS DOC 0-1 FALLS W/OUT INJ PAST YR: ICD-10-PCS | Mod: CPTII,S$GLB,, | Performed by: INTERNAL MEDICINE

## 2019-12-16 PROCEDURE — 83735 ASSAY OF MAGNESIUM: CPT | Mod: PN

## 2019-12-16 PROCEDURE — 85025 COMPLETE CBC W/AUTO DIFF WBC: CPT | Mod: PN

## 2019-12-16 PROCEDURE — 99499 UNLISTED E&M SERVICE: CPT | Mod: S$GLB,,, | Performed by: INTERNAL MEDICINE

## 2019-12-16 PROCEDURE — 99499 RISK ADDL DX/OHS AUDIT: ICD-10-PCS | Mod: S$GLB,,, | Performed by: INTERNAL MEDICINE

## 2019-12-16 PROCEDURE — 36415 COLL VENOUS BLD VENIPUNCTURE: CPT | Mod: PN

## 2019-12-16 PROCEDURE — 1159F MED LIST DOCD IN RCRD: CPT | Mod: S$GLB,,, | Performed by: INTERNAL MEDICINE

## 2019-12-16 PROCEDURE — 99999 PR PBB SHADOW E&M-EST. PATIENT-LVL IV: ICD-10-PCS | Mod: PBBFAC,,, | Performed by: INTERNAL MEDICINE

## 2019-12-16 PROCEDURE — 80053 COMPREHEN METABOLIC PANEL: CPT | Mod: PN

## 2019-12-16 PROCEDURE — 86301 IMMUNOASSAY TUMOR CA 19-9: CPT

## 2019-12-16 PROCEDURE — 1126F PR PAIN SEVERITY QUANTIFIED, NO PAIN PRESENT: ICD-10-PCS | Mod: S$GLB,,, | Performed by: INTERNAL MEDICINE

## 2019-12-16 PROCEDURE — 1126F AMNT PAIN NOTED NONE PRSNT: CPT | Mod: S$GLB,,, | Performed by: INTERNAL MEDICINE

## 2019-12-16 NOTE — LETTER
December 16, 2019        AUTUMN Blake MD  [address] [facility]  [facility] [address]             [facility]  [address]  [facility] [address]  Phone: [phone]  Fax: [phone]   Patient: Leonarda Almazan   MR Number: [id number]   YOB: 1940   Date of Visit: 12/16/2019       Dear Dr. Blake:    Thank you for referring Leonarda Almazan to me for evaluation of cystic pancreatic mass. Below are the relevant portions of my assessment and plan of care.  If you have questions, please do not hesitate to call me. I look forward to following Leonarda along with you.    Sincerely,      Marc Nayak MD           CC  No Recipients

## 2019-12-16 NOTE — PROGRESS NOTES
Chief complaint:  Cystic pancreatic mass    History of present illness:  The patient is an 79-year-old  gentleman presented with complaints of dyspepsia, underwent abdominal imaging, and was found to have cystic pancreatic mass.  He has been image with various modalities including ultrasound, MRCP, and CT.  The lesions appear to be most consistent with pseudocyst versus low-grade intraepithelial neoplasm.  Consultation is requested regarding evaluation and management.  Patient is not highly motivated toward additional workup or aggressive management in light of his advanced age.  He would definitely refuse surgery were he discovered to have malignancy.  Patient is fearful of frequent exposure to radiation for management of these difficulties and would prefer to alternate imaging with ultrasounds.  No other complaints pertinent findings on a 14 point review systems.    Past medical history:  1.  Acquired hypothyroidism  2.  Colon polyps  3.  Status post appendectomy  4.  Allergic rhinitis  5.  Dyspepsia    Allergies:  None known    Medications:  1.  Xyzal 5 mg nightly  2.  Synthroid 75 mcg daily  3.  Multivitamin 1 daily  4.  Zantac 150 mg nightly    Family/social history:  Patient is a never smoker.  Patient does not consume alcohol.  No family history of malignancy.    Physical examination:  The patient is a well-developed, thin appearing,  gentleman, with a weight of 104.5 lb.  VITAL SIGNS: Documented  and reviewed this visit.  HEENT:  Bitemporal wasting, atraumatic. Oral mucosa pink and moist. Lips without   lesions. Tongue midline. Oropharynx clear. Nonicteric sclerae.   NECK: Supple, no adenopathy. No carotid bruits, thyromegaly or thyroid nodule.   HEART: Regular rate and rhythm without murmur, gallop or rub.   LUNGS: Clear to auscultation bilaterally. Normal respiratory effort.   ABDOMEN: Soft, nontender, nondistended with positive normoactive bowel sounds, no hepatosplenomegaly.   EXTREMITIES: No  cyanosis, clubbing or edema. Distal pulses are intact. Increased bony prominences consistent with cachexia.  AXILLAE AND GROIN: No palpable pathologic lymphadenopathy is appreciated.   SKIN: Intact/turgor normal   NEUROLOGIC: Cranial nerves II-XII grossly intact. Motor:  Generalized loss of muscle bulk and   tone. Strength/sensory 5/5 throughout. Gait stable.     Abdominal ultrasound:  Study performed 01/14/2019.  1. Pancreatic tail cystic lesion similar on 06/30/2014, minimal growth cannot be excluded.  This could relate to a IPMT or pseudocyst.  Serous neoplasms could appear similar.  Please clinically correlate if further categorization is desired MRCP or endoscopic ultrasound may be of use.  Given that growth is only questioned and not definitive continued follow-up for size stability would be reasonable.    MRCP:  Study performed 09/18/2019.  Cystic pancreatic lesions suggesting IPMN.  No prior MRIs for comparison although this corresponds to lesion seen on prior ultrasounds dated back to early 6/30/2014 appearing larger today than on that exam and not definitely seen on earlier CT scan of 08/06/2009.  If this is to be further followed is suggest follow-up by CT or MRI.  And preferably with as well as without contrast if no contraindication to contrast.    CT abdomen with contrast:  Study performed 11/21/2019.  1. 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  2. Left lower lobe fibrosis/bronchiectasis.    Impression:  1.  Cystic pancreatic mass-consider pseudocyst versus intraepithelial neoplasm.  Feel that high-grade adenocarcinoma of the pancreas is far less likely.    Plan:  1.  Will obtain baseline lab to include CBC, CMP, LDH, and CA 19-9.  I will review results at earliest convenience.  2.  As patient is adamantly opposed to EUS and biopsy as well as surgery, I  will embark upon a course of expectant observation and plan to reimage with CT scanning of the abdomen and pelvis including contrast 4 months from now.  3.  40 min of contact time spent counseling patient regarding plan of care and alternative options for management.  He voices understanding wishes to proceed as above.    This note was created using voice recognition software and may contain grammatical errors.

## 2019-12-17 LAB — CANCER AG19-9 SERPL-ACNC: 93 U/ML (ref 2–40)

## 2020-01-15 ENCOUNTER — PATIENT MESSAGE (OUTPATIENT)
Dept: FAMILY MEDICINE | Facility: CLINIC | Age: 80
End: 2020-01-15

## 2020-02-28 ENCOUNTER — TELEPHONE (OUTPATIENT)
Dept: FAMILY MEDICINE | Facility: CLINIC | Age: 80
End: 2020-02-28

## 2020-02-28 NOTE — TELEPHONE ENCOUNTER
----- Message from Cal Mckenzie sent at 2/28/2020 10:39 AM CST -----  Type:  Sooner Apoointment Request    Caller is requesting a sooner appointment.  Caller declined first available appointment listed below.  Caller will not accept being placed on the waitlist and is requesting a message be sent to doctor.    Name of Caller:  Patient  When is the first available appointment?  07/21/20  Symptoms:  6 month FU-Provider canceled on 02/05  Best Call Back Number:  [phone]  Additional Information:

## 2020-03-16 DIAGNOSIS — E03.9 HYPOTHYROIDISM, UNSPECIFIED TYPE: ICD-10-CM

## 2020-03-16 NOTE — TELEPHONE ENCOUNTER
----- Message from Cherry Hensley sent at 3/16/2020 10:13 AM CDT -----  Contact: patient  Type:  RX Refill Request  Who Called:  patient  Refill or New Rx:  New  RX Name and Strength:  levothyroxine (SYNTHROID) 75 MCG tablet  How is the patient currently taking it? (ex. 1XDay):  1 x day  Is this a 30 day or 90 day RX:  90  Preferred Pharmacy with phone number:    [facility] - [place], [place] - [address]  [address]  [facility] [address]  Phone: [phone] Fax: [phone]  Local or Mail Order:  Local  Ordering Provider:  angela East Call Back Number:  [phone]  Additional Information:  na

## 2020-03-18 DIAGNOSIS — E03.9 HYPOTHYROIDISM, UNSPECIFIED TYPE: ICD-10-CM

## 2020-03-19 NOTE — TELEPHONE ENCOUNTER
----- Message from Darnell Jackson sent at 3/19/2020  9:34 AM CDT -----  Type: Needs Medical Advice    Who Called:  Self   Symptoms (please be specific):    How long has patient had these symptoms:    Pharmacy name and phone #:    Best Call Back Number: [phone]  Additional Information: Patient called asking for an update for [place] for rx levothyroxine (SYNTHROID) 75 MCG tablet

## 2020-03-22 RX ORDER — LEVOTHYROXINE SODIUM 75 UG/1
75 TABLET ORAL
Qty: 90 TABLET | Refills: 1 | Status: SHIPPED | OUTPATIENT
Start: 2020-03-22 | End: 2020-10-30 | Stop reason: SDUPTHER

## 2020-03-22 RX ORDER — LEVOTHYROXINE SODIUM 75 UG/1
TABLET ORAL
Qty: 30 TABLET | Refills: 0 | OUTPATIENT
Start: 2020-03-22

## 2020-03-23 NOTE — PROGRESS NOTES
Quick DC. Duplicate Request    Refill Authorization Note     is requesting a refill authorization.    Brief assessment and rationale for refill: Quick Dc; duplicate request               Medication reconciliation completed: No                         Comments:       Duplicate refill request created, medication is already pended in previous encounter, will add any additional notes to previous encounter and close out this duplicate request.       Pended Medication(s)   Requested Prescriptions     Refused Prescriptions Disp Refills    levothyroxine (SYNTHROID) 75 MCG tablet [Pharmacy Med Name: Levothyroxine Sodium 75 MCG Oral Tablet] 30 tablet 0     Sig: TAKE 1 TABLET BY MOUTH BEFORE BREAKFAST     Refused By: ILENE RICHARDSON     Reason for Refusal: Request already responded to by other means (e.g. phone or fax)          Duplicate Pended Encounter(s) Details:    Ordering User:  Ilene Richardson, PCT      Cosigner:  AUTUMN Blake MD Signed:  --           Diagnosis Association: Hypothyroidism, unspecified type (E03.9)      Original Order:  levothyroxine (SYNTHROID) 75 MCG tablet [[id number]]      Pharmacy:  [place] [id number] OMAR #:  --     Pharmacy Comments:  --          Fill quantity remaining:  -- Fill quantity used:  -- Next fill due: --       Outpatient Medication Detail      Disp Refills Start End    levothyroxine (SYNTHROID) 75 MCG tablet 90 tablet 1 3/22/2020     Sig - Route: Take 1 tablet (75 mcg total) by mouth before breakfast. - Oral    Sent to pharmacy as: levothyroxine (SYNTHROID) 75 MCG tablet    Notes to Pharmacy: Please inactivate all prior scripts with same name and strength including any scripts on hold.    Cosign for Ordering: Required by AUTUMN Blake MD    E-Prescribing Status: Receipt confirmed by pharmacy (3/22/2020  8:45 PM CDT)                   Note composed:8:47 PM 03/22/2020

## 2020-03-23 NOTE — PROGRESS NOTES
Refill Authorization Note     is requesting a refill authorization.    Brief assessment and rationale for refill: APPROVE:prr  Name and strength of medication: levothyroxine (SYNTHROID) 75 MCG tablet            Medication reconciliation completed: No                         Comments:   Refill [facility] [place] [facility] protocols temporarily suspended.   Requested Prescriptions   Signed Prescriptions Disp Refills    levothyroxine (SYNTHROID) 75 MCG tablet 90 tablet 1     Sig: Take 1 tablet (75 mcg total) by mouth before breakfast.       Endocrinology:  Hypothyroid Agents Failed - 3/22/2020  8:42 PM        Failed - Manual Review: FT4 is not required if last TSH is WNL.        Failed - T4 free within 1080 days     Free T4   Date Value Ref Range Status   09/19/2013 1.60 (H) 0.71 - 1.51 ng/dL Final   10/22/2012 1.70 (H) 0.71 - 1.51 ng/dl Final   09/20/2011 1.39 0.71 - 1.51 ng/dl Final              Passed - Patient is at least 18 years old        Passed - Office visit in past 12 months or future 90 days.     Recent Outpatient Visits            3 months ago Primary pancreatic cancer     [facility] Ad[facility] Rothman III, PA-C    4 months ago Vertigo    [facility] Adarsh Rothman III, PA-C    7 months ago Wellness examination    [facility] AUTUMN Blake MD    11 months ago Chronic bronchitis, unspecified chronic bronchitis type    [facility] AUTUMN Blake MD    1 year ago Wellness examination    [facility] AUTUMN Blake MD          Future Appointments              In 3 weeks [facility] CT1 LIMIT 500 LBS [facility]    In 1 month Marc Nayak MD [facility]    In 1 month India Jackson DNP, APRN [facility]                Passed - TSH in normal range and within 360 days     TSH   Date Value Ref Range Status   07/25/2019 0.483  0.400 - 4.000 uIU/mL Final   01/14/2019 2.045 0.400 - 4.000 uIU/mL Final   11/28/2017 1.390 0.400 - 4.000 uIU/mL Final               Appointments  past 12m or future 3m with PCP    Date Provider   Last Visit   8/5/2019 AUTUMN Blake MD   Next Visit   No visit found  AUTUMN Blake MD   .  ED visits in past 90 days: 0       Note composed:8:43 PM 03/22/2020

## 2020-04-09 ENCOUNTER — TELEPHONE (OUTPATIENT)
Dept: HEMATOLOGY/ONCOLOGY | Facility: CLINIC | Age: 80
End: 2020-04-09

## 2020-04-09 DIAGNOSIS — R97.8 OTHER ABNORMAL TUMOR MARKERS: ICD-10-CM

## 2020-04-09 DIAGNOSIS — K86.2 CYSTIC MASS OF PANCREAS: Primary | ICD-10-CM

## 2020-04-09 NOTE — TELEPHONE ENCOUNTER
----- Message from Tena Calderon sent at 4/9/2020  9:07 AM CDT -----  Contact: PT  PT called requesting for his appointment to be rescheduled    Callback: [phone]

## 2020-08-24 ENCOUNTER — TELEPHONE (OUTPATIENT)
Dept: HEMATOLOGY/ONCOLOGY | Facility: CLINIC | Age: 80
End: 2020-08-24

## 2020-08-24 NOTE — TELEPHONE ENCOUNTER
----- Message from Arinana Rodgers sent at 8/22/2020 10:22 AM CDT -----  Regarding: Reschedule Request  Contact: PT  PT called in to reschedule.   Please reach out to PT.   CBN# [phone]   Any day after sept 1st, same time.  If PT does not answer, he asked that a detailed VM be left.

## 2020-08-25 ENCOUNTER — HOSPITAL ENCOUNTER (OUTPATIENT)
Dept: RADIOLOGY | Facility: HOSPITAL | Age: 80
Discharge: HOME | End: 2020-08-25
Attending: INTERNAL MEDICINE
Payer: MEDICARE

## 2020-08-25 DIAGNOSIS — K86.2 CYSTIC MASS OF PANCREAS: ICD-10-CM

## 2020-08-25 PROCEDURE — 74160 CT ABDOMEN W/CONTRAST: CPT | Mod: 26,,, | Performed by: RADIOLOGY

## 2020-08-25 PROCEDURE — 74160 CT ABDOMEN WITH CONTRAST: ICD-10-PCS | Mod: 26,,, | Performed by: RADIOLOGY

## 2020-08-25 PROCEDURE — A9698 NON-RAD CONTRAST MATERIALNOC: HCPCS | Mod: PO | Performed by: INTERNAL MEDICINE

## 2020-08-25 PROCEDURE — 74160 CT ABDOMEN W/CONTRAST: CPT | Mod: TC,PO

## 2020-08-25 PROCEDURE — 25500020 PHARM REV CODE 255: Mod: PO | Performed by: INTERNAL MEDICINE

## 2020-08-25 RX ADMIN — IOHEXOL 75 ML: 350 INJECTION, SOLUTION INTRAVENOUS at 09:08

## 2020-08-25 RX ADMIN — IOHEXOL 500 ML: 9 SOLUTION ORAL at 09:08

## 2020-09-03 ENCOUNTER — OFFICE VISIT (OUTPATIENT)
Dept: HEMATOLOGY/ONCOLOGY | Facility: CLINIC | Age: 80
End: 2020-09-03
Payer: MEDICARE

## 2020-09-03 VITALS
HEART RATE: 76 BPM | RESPIRATION RATE: 20 BRPM | WEIGHT: 93.56 LBS | BODY MASS INDEX: 14.69 KG/M2 | TEMPERATURE: 98 F | DIASTOLIC BLOOD PRESSURE: 56 MMHG | SYSTOLIC BLOOD PRESSURE: 90 MMHG | OXYGEN SATURATION: 97 % | HEIGHT: 67 IN

## 2020-09-03 DIAGNOSIS — R97.8 OTHER ABNORMAL TUMOR MARKERS: ICD-10-CM

## 2020-09-03 DIAGNOSIS — R63.4 WEIGHT LOSS: ICD-10-CM

## 2020-09-03 DIAGNOSIS — K86.2 CYSTIC MASS OF PANCREAS: Primary | ICD-10-CM

## 2020-09-03 PROCEDURE — 1126F PR PAIN SEVERITY QUANTIFIED, NO PAIN PRESENT: ICD-10-PCS | Mod: S$GLB,,, | Performed by: INTERNAL MEDICINE

## 2020-09-03 PROCEDURE — 99999 PR PBB SHADOW E&M-EST. PATIENT-LVL IV: ICD-10-PCS | Mod: PBBFAC,,, | Performed by: INTERNAL MEDICINE

## 2020-09-03 PROCEDURE — 99499 UNLISTED E&M SERVICE: CPT | Mod: S$GLB,,, | Performed by: INTERNAL MEDICINE

## 2020-09-03 PROCEDURE — 99499 RISK ADDL DX/OHS AUDIT: ICD-10-PCS | Mod: S$GLB,,, | Performed by: INTERNAL MEDICINE

## 2020-09-03 PROCEDURE — 1101F PR PT FALLS ASSESS DOC 0-1 FALLS W/OUT INJ PAST YR: ICD-10-PCS | Mod: CPTII,S$GLB,, | Performed by: INTERNAL MEDICINE

## 2020-09-03 PROCEDURE — 1159F MED LIST DOCD IN RCRD: CPT | Mod: S$GLB,,, | Performed by: INTERNAL MEDICINE

## 2020-09-03 PROCEDURE — 99214 PR OFFICE/OUTPT VISIT, EST, LEVL IV, 30-39 MIN: ICD-10-PCS | Mod: S$GLB,,, | Performed by: INTERNAL MEDICINE

## 2020-09-03 PROCEDURE — 1101F PT FALLS ASSESS-DOCD LE1/YR: CPT | Mod: CPTII,S$GLB,, | Performed by: INTERNAL MEDICINE

## 2020-09-03 PROCEDURE — 99999 PR PBB SHADOW E&M-EST. PATIENT-LVL IV: CPT | Mod: PBBFAC,,, | Performed by: INTERNAL MEDICINE

## 2020-09-03 PROCEDURE — 1159F PR MEDICATION LIST DOCUMENTED IN MEDICAL RECORD: ICD-10-PCS | Mod: S$GLB,,, | Performed by: INTERNAL MEDICINE

## 2020-09-03 PROCEDURE — 99214 OFFICE O/P EST MOD 30 MIN: CPT | Mod: S$GLB,,, | Performed by: INTERNAL MEDICINE

## 2020-09-03 PROCEDURE — 1126F AMNT PAIN NOTED NONE PRSNT: CPT | Mod: S$GLB,,, | Performed by: INTERNAL MEDICINE

## 2020-09-03 NOTE — PROGRESS NOTES
History of present illness:  The patient is an 79-year-old  gentleman presented with complaints of dyspepsia, underwent abdominal imaging, and was found to have cystic pancreatic mass.  He has been image with various modalities including ultrasound, MRCP, and CT.  The lesions appear to be most consistent with pseudocyst versus low-grade intraepithelial neoplasm.  Consultation is requested regarding evaluation and management.  Patient is not highly motivated toward additional workup or aggressive management in light of his advanced age.  He would definitely refuse surgery were he discovered to have malignancy.  Patient is fearful of frequent exposure to radiation for management of these difficulties and would prefer to alternate imaging with ultrasounds.      Patient returns to clinic for interval follow-up including imaging.  He is currently self medicating with Phenbendazole (an antihelminth used in veterinary medicine).  No other complaints pertinent findings on a 14 point review systems.    Physical examination:  The patient is a well-developed, thin appearing,  gentleman, with a weight of 93.5 lb (decreased by 11 lb).  VITAL SIGNS: Documented  and reviewed this visit.  HEENT:  Bitemporal wasting, atraumatic. Oral mucosa pink and moist. Lips without   lesions. Tongue midline. Oropharynx clear. Nonicteric sclerae.   NECK: Supple, no adenopathy. No carotid bruits, thyromegaly or thyroid nodule.   HEART: Regular rate and rhythm without murmur, gallop or rub.   LUNGS: Clear to auscultation bilaterally. Normal respiratory effort.   ABDOMEN: Soft, nontender, nondistended with positive normoactive bowel sounds, no hepatosplenomegaly.   EXTREMITIES: No cyanosis, clubbing or edema. Distal pulses are intact. Increased bony prominences consistent with cachexia.  AXILLAE AND GROIN: No palpable pathologic lymphadenopathy is appreciated.   SKIN: Intact/turgor normal   NEUROLOGIC: Cranial nerves II-XII grossly intact.  Motor:  Generalized loss of muscle bulk and   tone. Strength/sensory 5/5 throughout. Gait stable.     Laboratory:  White count 6.9, hemoglobin 12.4, hematocrit 37.5, platelets 286, absolute neutrophil count 4800.  Sodium 139, potassium 4.4, chloride 100, CO2 28, BUN 19, creatinine 0.8, glucose 98, calcium 9.8, magnesium 2.1, total bilirubin 1.2, AST 20, ALT 17, alkaline phosphatase 84, , GFR greater than 60.  CA 19-9 128 (up from 93).    CT abdomen:  1. No interval detrimental change when compared to the prior examination.  Continued demonstration cystic masses within the pancreatic body which are stable in size and number as compared to the previous study.  There is possible side-branch dilatation in the pancreatic body in the main pancreatic duct is dilated within the pancreatic body.  IPMN cannot be excluded.  2. 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    Impression:  1.  Cystic pancreatic mass - stable per most recent imaging.  2.  Rising CA 19-9.  3.  Weight loss.    Plan:  1.  Patient remains adamantly opposed to consideration of diagnostic studies including EUS/biopsy or interventions such as surgery/radiation.  He would prefer to continue medicating with phenbendazole.  2.  I offered the patient another short interval follow-up with limit imaging.  However, he prefers to have this performed in 6 months.  Studies will include CBC, CMP, LDH, CA 19-9, and CT scanning of the abdomen.    This note was created using voice recognition software and may contain grammatical errors.

## 2020-10-03 ENCOUNTER — TELEPHONE (OUTPATIENT)
Dept: FAMILY MEDICINE | Facility: CLINIC | Age: 80
End: 2020-10-03

## 2020-10-03 NOTE — TELEPHONE ENCOUNTER
----- Message from Sarai Moreno MA sent at 10/1/2020  1:11 PM CDT -----  Regarding: CB  Pt is requesting a call back in regards to an OTC medication and meds on his meds list.   Meds: levothyroxine (SYNTHROID) 75 MCG tablet  Call Back # [phone]

## 2020-10-13 ENCOUNTER — IMMUNIZATION (OUTPATIENT)
Dept: FAMILY MEDICINE | Facility: CLINIC | Age: 80
End: 2020-10-13
Payer: MEDICARE

## 2020-10-13 PROCEDURE — G0008 FLU VACCINE - QUADRIVALENT - ADJUVANTED: ICD-10-PCS | Mod: S$GLB,,, | Performed by: FAMILY MEDICINE

## 2020-10-13 PROCEDURE — 90694 FLU VACCINE - QUADRIVALENT - ADJUVANTED: ICD-10-PCS | Mod: S$GLB,,, | Performed by: FAMILY MEDICINE

## 2020-10-13 PROCEDURE — G0008 ADMIN INFLUENZA VIRUS VAC: HCPCS | Mod: S$GLB,,, | Performed by: FAMILY MEDICINE

## 2020-10-13 PROCEDURE — 90694 VACC AIIV4 NO PRSRV 0.5ML IM: CPT | Mod: S$GLB,,, | Performed by: FAMILY MEDICINE

## 2020-10-15 ENCOUNTER — PES CALL (OUTPATIENT)
Dept: ADMINISTRATIVE | Facility: CLINIC | Age: 80
End: 2020-10-15

## 2020-10-20 ENCOUNTER — TELEPHONE (OUTPATIENT)
Dept: FAMILY MEDICINE | Facility: CLINIC | Age: 80
End: 2020-10-20

## 2020-10-30 DIAGNOSIS — E03.9 HYPOTHYROIDISM, UNSPECIFIED TYPE: ICD-10-CM

## 2020-10-30 DIAGNOSIS — D64.9 ANEMIA, UNSPECIFIED TYPE: Primary | ICD-10-CM

## 2020-10-30 DIAGNOSIS — Z00.00 PREVENTATIVE HEALTH CARE: ICD-10-CM

## 2020-10-30 RX ORDER — LEVOTHYROXINE SODIUM 75 UG/1
75 TABLET ORAL
Qty: 90 TABLET | Refills: 0 | Status: SHIPPED | OUTPATIENT
Start: 2020-10-30 | End: 2021-05-09 | Stop reason: SDUPTHER

## 2020-10-30 NOTE — TELEPHONE ENCOUNTER
Care Due:                  Date            Visit Type   Department     Provider  --------------------------------------------------------------------------------                                ESTABLISHED                              PATIENT      [facility] FAMILY  Last Visit: 08-      [facility]       AUTUMN MANZANO  Next Visit: None Scheduled  None         None Found                                                            Last  Test          Frequency    Reason                     Performed    Due Date  --------------------------------------------------------------------------------    Office Visit  12 months..  levothyroxine............  08- 07-    TSH.........  12 months..  levothyroxine............  07- 07-    Powered by [employer]. Reference number: [id number]. 10/30/2020 8:20:53 AM   CANDACE

## 2020-10-30 NOTE — PROGRESS NOTES
Provider Staff:     Action is required for this patient.     Please schedule patient for Annual and Labs (TSH)    Thanks!  [facility]     Appointments  past 12m or future 3m with PCP    Date Provider   Last Visit   8/5/2019 AUTUMN Blake MD   Next Visit   Visit date not found AUTUMN Blake MD     Note composed:6:00 PM 10/30/2020

## 2020-10-30 NOTE — PROGRESS NOTES
Refill Authorization Note   Leonarda Almazan is requesting a refill authorization.  Brief assessment and rationale for refill: Approve    -Medication-related problems identified:   Requires labs  Requires appointment  Medication Therapy Plan: CDMR. LABS: (TSH); APPT: (Annual)    Medication reconciliation completed: No   Comments:   Orders Placed This Encounter    TSH    levothyroxine (SYNTHROID) 75 MCG tablet      Requested Prescriptions   Signed Prescriptions Disp Refills    levothyroxine (SYNTHROID) 75 MCG tablet 90 tablet 0     Sig: Take 1 tablet (75 mcg total) by mouth before breakfast.       Endocrinology:  Hypothyroid Agents Failed - 10/30/2020  8:23 AM        Failed - Manual Review: FT4 is not required if last TSH is WNL.        Failed - TSH in normal range and within 360 days     TSH   Date Value Ref Range Status   07/25/2019 0.483 0.400 - 4.000 uIU/mL Final   01/14/2019 2.045 0.400 - 4.000 uIU/mL Final   11/28/2017 1.390 0.400 - 4.000 uIU/mL Final              Failed - T4 free within 1080 days     Free T4   Date Value Ref Range Status   09/19/2013 1.60 (H) 0.71 - 1.51 ng/dL Final   10/22/2012 1.70 (H) 0.71 - 1.51 ng/dl Final   09/20/2011 1.39 0.71 - 1.51 ng/dl Final              Passed - Patient is at least 18 years old        Passed - Office visit in past 12 months or future 90 days     Recent Outpatient Visits            1 month ago Cystic mass of pancreas    [facility] Marc Nayak MD    11 months ago Primary pancreatic cancer     [facility] Adarsh Rothman III, PA-C    11 months ago Vertigo    [facility] Adarsh Rothman III, PA-C    1 year ago Wellness examination    [facility] AUTUMN Blake MD    1 year ago Chronic bronchitis, unspecified chronic bronchitis type    [facility] AUTUMN Blake MD          Future Appointments              In 3 months [facility] CT1 LIMIT 500 LBS [facility],  [place]    In 4 months Marc Nayak MD [facility]                    Appointments  past 12m or future 3m with PCP    Date Provider   Last Visit   8/5/2019 AUTUMN Blake MD   Next Visit   Visit date not found AUTUMN Blake MD   ED visits in past 90 days: 0     Note composed:5:58 PM 10/30/2020

## 2020-11-09 NOTE — TELEPHONE ENCOUNTER
Patient requesting labs prior to upcoming appointment.   Several of these are duplicates of Dr Nayak's orders.     Please review and advise. Patient is asking to scheduled these 2/2021

## 2020-11-10 ENCOUNTER — TELEPHONE (OUTPATIENT)
Dept: FAMILY MEDICINE | Facility: CLINIC | Age: 80
End: 2020-11-10

## 2020-11-10 NOTE — TELEPHONE ENCOUNTER
----- Message from Mari Givens sent at 11/10/2020  9:47 AM CST -----  Type:  Patient Returning Call    Who Called:  Patient   Who Left Message for Patient:  Heather  Does the patient know what this is regarding?:  Scheduling   Best Call Back Number:    Additional Information:  Please advise-thank you

## 2020-11-29 ENCOUNTER — PATIENT MESSAGE (OUTPATIENT)
Dept: FAMILY MEDICINE | Facility: CLINIC | Age: 80
End: 2020-11-29

## 2021-01-01 ENCOUNTER — OFFICE VISIT (OUTPATIENT)
Dept: FAMILY MEDICINE | Facility: CLINIC | Age: 81
End: 2021-01-01
Payer: MEDICARE

## 2021-01-01 VITALS
SYSTOLIC BLOOD PRESSURE: 104 MMHG | DIASTOLIC BLOOD PRESSURE: 60 MMHG | HEART RATE: 90 BPM | TEMPERATURE: 98 F | HEIGHT: 67 IN | OXYGEN SATURATION: 98 % | BODY MASS INDEX: 14.81 KG/M2 | WEIGHT: 94.38 LBS

## 2021-01-01 DIAGNOSIS — J44.9 CHRONIC OBSTRUCTIVE PULMONARY DISEASE, UNSPECIFIED COPD TYPE: ICD-10-CM

## 2021-01-01 DIAGNOSIS — D64.9 ANEMIA, UNSPECIFIED TYPE: ICD-10-CM

## 2021-01-01 DIAGNOSIS — Z00.00 WELLNESS EXAMINATION: Primary | ICD-10-CM

## 2021-01-01 DIAGNOSIS — D53.9 NUTRITIONAL ANEMIA: ICD-10-CM

## 2021-01-01 PROCEDURE — 99999 PR PBB SHADOW E&M-EST. PATIENT-LVL III: CPT | Mod: PBBFAC,,, | Performed by: FAMILY MEDICINE

## 2021-01-01 PROCEDURE — 99214 OFFICE O/P EST MOD 30 MIN: CPT | Mod: S$GLB,,, | Performed by: FAMILY MEDICINE

## 2021-01-01 PROCEDURE — 3074F SYST BP LT 130 MM HG: CPT | Mod: S$GLB,,, | Performed by: FAMILY MEDICINE

## 2021-01-01 PROCEDURE — G0008 ADMIN INFLUENZA VIRUS VAC: HCPCS | Mod: S$GLB,,, | Performed by: FAMILY MEDICINE

## 2021-01-01 PROCEDURE — 1101F PR PT FALLS ASSESS DOC 0-1 FALLS W/OUT INJ PAST YR: ICD-10-PCS | Mod: S$GLB,,, | Performed by: FAMILY MEDICINE

## 2021-01-01 PROCEDURE — 1126F AMNT PAIN NOTED NONE PRSNT: CPT | Mod: S$GLB,,, | Performed by: FAMILY MEDICINE

## 2021-01-01 PROCEDURE — 3078F PR MOST RECENT DIASTOLIC BLOOD PRESSURE < 80 MM HG: ICD-10-PCS | Mod: S$GLB,,, | Performed by: FAMILY MEDICINE

## 2021-01-01 PROCEDURE — 3288F FALL RISK ASSESSMENT DOCD: CPT | Mod: S$GLB,,, | Performed by: FAMILY MEDICINE

## 2021-01-01 PROCEDURE — 1101F PT FALLS ASSESS-DOCD LE1/YR: CPT | Mod: S$GLB,,, | Performed by: FAMILY MEDICINE

## 2021-01-01 PROCEDURE — 1126F PR PAIN SEVERITY QUANTIFIED, NO PAIN PRESENT: ICD-10-PCS | Mod: S$GLB,,, | Performed by: FAMILY MEDICINE

## 2021-01-01 PROCEDURE — 90694 VACC AIIV4 NO PRSRV 0.5ML IM: CPT | Mod: S$GLB,,, | Performed by: FAMILY MEDICINE

## 2021-01-01 PROCEDURE — 1159F PR MEDICATION LIST DOCUMENTED IN MEDICAL RECORD: ICD-10-PCS | Mod: S$GLB,,, | Performed by: FAMILY MEDICINE

## 2021-01-01 PROCEDURE — 3074F PR MOST RECENT SYSTOLIC BLOOD PRESSURE < 130 MM HG: ICD-10-PCS | Mod: S$GLB,,, | Performed by: FAMILY MEDICINE

## 2021-01-01 PROCEDURE — 99214 PR OFFICE/OUTPT VISIT, EST, LEVL IV, 30-39 MIN: ICD-10-PCS | Mod: S$GLB,,, | Performed by: FAMILY MEDICINE

## 2021-01-01 PROCEDURE — 99999 PR PBB SHADOW E&M-EST. PATIENT-LVL III: ICD-10-PCS | Mod: PBBFAC,,, | Performed by: FAMILY MEDICINE

## 2021-01-01 PROCEDURE — G0008 FLU VACCINE - QUADRIVALENT - ADJUVANTED: ICD-10-PCS | Mod: S$GLB,,, | Performed by: FAMILY MEDICINE

## 2021-01-01 PROCEDURE — 90694 FLU VACCINE - QUADRIVALENT - ADJUVANTED: ICD-10-PCS | Mod: S$GLB,,, | Performed by: FAMILY MEDICINE

## 2021-01-01 PROCEDURE — 3078F DIAST BP <80 MM HG: CPT | Mod: S$GLB,,, | Performed by: FAMILY MEDICINE

## 2021-01-01 PROCEDURE — 1159F MED LIST DOCD IN RCRD: CPT | Mod: S$GLB,,, | Performed by: FAMILY MEDICINE

## 2021-01-01 PROCEDURE — 3288F PR FALLS RISK ASSESSMENT DOCUMENTED: ICD-10-PCS | Mod: S$GLB,,, | Performed by: FAMILY MEDICINE

## 2021-01-01 RX ORDER — FLUTICASONE PROPIONATE 110 UG/1
1 AEROSOL, METERED RESPIRATORY (INHALATION) 2 TIMES DAILY
Qty: 12 G | Refills: 2 | Status: SHIPPED | OUTPATIENT
Start: 2021-01-01 | End: 2022-01-01

## 2021-01-01 RX ORDER — LANOLIN ALCOHOL/MO/W.PET/CERES
100 CREAM (GRAM) TOPICAL DAILY
COMMUNITY

## 2021-01-01 RX ORDER — IBUPROFEN 100 MG/5ML
5000 SUSPENSION, ORAL (FINAL DOSE FORM) ORAL DAILY
Status: ON HOLD | COMMUNITY
End: 2022-01-01 | Stop reason: HOSPADM

## 2021-01-01 RX ORDER — VITAMIN B COMPLEX
1 CAPSULE ORAL DAILY
Status: ON HOLD | COMMUNITY
End: 2022-01-01 | Stop reason: HOSPADM

## 2021-01-09 ENCOUNTER — IMMUNIZATION (OUTPATIENT)
Dept: FAMILY MEDICINE | Facility: CLINIC | Age: 81
End: 2021-01-09
Payer: MEDICARE

## 2021-01-09 DIAGNOSIS — Z23 NEED FOR VACCINATION: ICD-10-CM

## 2021-01-09 PROCEDURE — 91300 COVID-19, MRNA, LNP-S, PF, 30 MCG/0.3 ML DOSE VACCINE: CPT | Mod: PBBFAC | Performed by: INTERNAL MEDICINE

## 2021-01-30 ENCOUNTER — IMMUNIZATION (OUTPATIENT)
Dept: FAMILY MEDICINE | Facility: CLINIC | Age: 81
End: 2021-01-30
Payer: MEDICARE

## 2021-01-30 DIAGNOSIS — Z23 NEED FOR VACCINATION: Primary | ICD-10-CM

## 2021-01-30 PROCEDURE — 91300 COVID-19, MRNA, LNP-S, PF, 30 MCG/0.3 ML DOSE VACCINE: CPT | Mod: PBBFAC | Performed by: INTERNAL MEDICINE

## 2021-01-30 PROCEDURE — 0002A COVID-19, MRNA, LNP-S, PF, 30 MCG/0.3 ML DOSE VACCINE: CPT | Mod: PBBFAC | Performed by: INTERNAL MEDICINE

## 2021-02-24 ENCOUNTER — HOSPITAL ENCOUNTER (OUTPATIENT)
Dept: RADIOLOGY | Facility: HOSPITAL | Age: 81
Discharge: HOME | End: 2021-02-24
Attending: INTERNAL MEDICINE
Payer: MEDICARE

## 2021-02-24 DIAGNOSIS — K86.2 CYSTIC MASS OF PANCREAS: ICD-10-CM

## 2021-02-24 DIAGNOSIS — R97.8 OTHER ABNORMAL TUMOR MARKERS: ICD-10-CM

## 2021-02-24 PROCEDURE — 74177 CT ABD & PELVIS W/CONTRAST: CPT | Mod: TC,PO

## 2021-02-24 PROCEDURE — 74177 CT ABD & PELVIS W/CONTRAST: CPT | Mod: 26,,, | Performed by: RADIOLOGY

## 2021-02-24 PROCEDURE — 71260 CT THORAX DX C+: CPT | Mod: 26,,, | Performed by: RADIOLOGY

## 2021-02-24 PROCEDURE — 25500020 PHARM REV CODE 255: Mod: PO | Performed by: INTERNAL MEDICINE

## 2021-02-24 PROCEDURE — 74177 CT CHEST ABDOMEN PELVIS WITH CONTRAST (XPD): ICD-10-PCS | Mod: 26,,, | Performed by: RADIOLOGY

## 2021-02-24 PROCEDURE — 71260 CT CHEST ABDOMEN PELVIS WITH CONTRAST (XPD): ICD-10-PCS | Mod: 26,,, | Performed by: RADIOLOGY

## 2021-02-24 PROCEDURE — 71260 CT THORAX DX C+: CPT | Mod: TC,PO

## 2021-02-24 PROCEDURE — A9698 NON-RAD CONTRAST MATERIALNOC: HCPCS | Mod: PO | Performed by: INTERNAL MEDICINE

## 2021-02-24 RX ADMIN — IOHEXOL 1000 ML: 9 SOLUTION ORAL at 09:02

## 2021-02-24 RX ADMIN — IOHEXOL 75 ML: 350 INJECTION, SOLUTION INTRAVENOUS at 09:02

## 2021-03-01 ENCOUNTER — OFFICE VISIT (OUTPATIENT)
Dept: FAMILY MEDICINE | Facility: CLINIC | Age: 81
End: 2021-03-01
Payer: MEDICARE

## 2021-03-01 VITALS
OXYGEN SATURATION: 96 % | BODY MASS INDEX: 15.33 KG/M2 | WEIGHT: 97.69 LBS | SYSTOLIC BLOOD PRESSURE: 86 MMHG | HEART RATE: 87 BPM | HEIGHT: 67 IN | DIASTOLIC BLOOD PRESSURE: 60 MMHG | TEMPERATURE: 97 F

## 2021-03-01 DIAGNOSIS — K86.2 CYSTIC MASS OF PANCREAS: ICD-10-CM

## 2021-03-01 DIAGNOSIS — E03.9 HYPOTHYROIDISM, UNSPECIFIED TYPE: Primary | ICD-10-CM

## 2021-03-01 DIAGNOSIS — R91.8 LUNG NODULES: ICD-10-CM

## 2021-03-01 DIAGNOSIS — E44.0 MALNUTRITION OF MODERATE DEGREE: ICD-10-CM

## 2021-03-01 DIAGNOSIS — J42 CHRONIC BRONCHITIS, UNSPECIFIED CHRONIC BRONCHITIS TYPE: ICD-10-CM

## 2021-03-01 DIAGNOSIS — A31.0 MYCOBACTERIUM AVIUM INFECTION: ICD-10-CM

## 2021-03-01 DIAGNOSIS — I70.0 AORTIC ATHEROSCLEROSIS: ICD-10-CM

## 2021-03-01 PROCEDURE — 1159F MED LIST DOCD IN RCRD: CPT | Mod: S$GLB,,, | Performed by: FAMILY MEDICINE

## 2021-03-01 PROCEDURE — 1159F PR MEDICATION LIST DOCUMENTED IN MEDICAL RECORD: ICD-10-PCS | Mod: S$GLB,,, | Performed by: FAMILY MEDICINE

## 2021-03-01 PROCEDURE — 3288F FALL RISK ASSESSMENT DOCD: CPT | Mod: S$GLB,,, | Performed by: FAMILY MEDICINE

## 2021-03-01 PROCEDURE — 3288F PR FALLS RISK ASSESSMENT DOCUMENTED: ICD-10-PCS | Mod: S$GLB,,, | Performed by: FAMILY MEDICINE

## 2021-03-01 PROCEDURE — 99214 PR OFFICE/OUTPT VISIT, EST, LEVL IV, 30-39 MIN: ICD-10-PCS | Mod: S$GLB,,, | Performed by: FAMILY MEDICINE

## 2021-03-01 PROCEDURE — 99214 OFFICE O/P EST MOD 30 MIN: CPT | Mod: S$GLB,,, | Performed by: FAMILY MEDICINE

## 2021-03-01 PROCEDURE — 99999 PR PBB SHADOW E&M-EST. PATIENT-LVL III: CPT | Mod: PBBFAC,,, | Performed by: FAMILY MEDICINE

## 2021-03-01 PROCEDURE — 1101F PT FALLS ASSESS-DOCD LE1/YR: CPT | Mod: S$GLB,,, | Performed by: FAMILY MEDICINE

## 2021-03-01 PROCEDURE — 1101F PR PT FALLS ASSESS DOC 0-1 FALLS W/OUT INJ PAST YR: ICD-10-PCS | Mod: S$GLB,,, | Performed by: FAMILY MEDICINE

## 2021-03-01 PROCEDURE — 1126F AMNT PAIN NOTED NONE PRSNT: CPT | Mod: S$GLB,,, | Performed by: FAMILY MEDICINE

## 2021-03-01 PROCEDURE — 99999 PR PBB SHADOW E&M-EST. PATIENT-LVL III: ICD-10-PCS | Mod: PBBFAC,,, | Performed by: FAMILY MEDICINE

## 2021-03-01 PROCEDURE — 1126F PR PAIN SEVERITY QUANTIFIED, NO PAIN PRESENT: ICD-10-PCS | Mod: S$GLB,,, | Performed by: FAMILY MEDICINE

## 2021-03-03 ENCOUNTER — OFFICE VISIT (OUTPATIENT)
Dept: HEMATOLOGY/ONCOLOGY | Facility: CLINIC | Age: 81
End: 2021-03-03
Payer: MEDICARE

## 2021-03-03 VITALS
HEIGHT: 67 IN | TEMPERATURE: 98 F | DIASTOLIC BLOOD PRESSURE: 58 MMHG | BODY MASS INDEX: 15.54 KG/M2 | OXYGEN SATURATION: 100 % | RESPIRATION RATE: 18 BRPM | HEART RATE: 104 BPM | WEIGHT: 99 LBS | SYSTOLIC BLOOD PRESSURE: 114 MMHG

## 2021-03-03 DIAGNOSIS — K86.2 CYSTIC MASS OF PANCREAS: Primary | ICD-10-CM

## 2021-03-03 DIAGNOSIS — A31.9 ATYPICAL MYCOBACTERIAL INFECTION: ICD-10-CM

## 2021-03-03 DIAGNOSIS — R97.8 OTHER ABNORMAL TUMOR MARKERS: ICD-10-CM

## 2021-03-03 DIAGNOSIS — R63.4 WEIGHT LOSS: ICD-10-CM

## 2021-03-03 PROCEDURE — 1126F AMNT PAIN NOTED NONE PRSNT: CPT | Mod: S$GLB,,, | Performed by: INTERNAL MEDICINE

## 2021-03-03 PROCEDURE — 99214 PR OFFICE/OUTPT VISIT, EST, LEVL IV, 30-39 MIN: ICD-10-PCS | Mod: S$GLB,,, | Performed by: INTERNAL MEDICINE

## 2021-03-03 PROCEDURE — 1159F MED LIST DOCD IN RCRD: CPT | Mod: S$GLB,,, | Performed by: INTERNAL MEDICINE

## 2021-03-03 PROCEDURE — 99999 PR PBB SHADOW E&M-EST. PATIENT-LVL IV: ICD-10-PCS | Mod: PBBFAC,,, | Performed by: INTERNAL MEDICINE

## 2021-03-03 PROCEDURE — 1101F PT FALLS ASSESS-DOCD LE1/YR: CPT | Mod: S$GLB,,, | Performed by: INTERNAL MEDICINE

## 2021-03-03 PROCEDURE — 1101F PR PT FALLS ASSESS DOC 0-1 FALLS W/OUT INJ PAST YR: ICD-10-PCS | Mod: S$GLB,,, | Performed by: INTERNAL MEDICINE

## 2021-03-03 PROCEDURE — 99999 PR PBB SHADOW E&M-EST. PATIENT-LVL IV: CPT | Mod: PBBFAC,,, | Performed by: INTERNAL MEDICINE

## 2021-03-03 PROCEDURE — 3288F FALL RISK ASSESSMENT DOCD: CPT | Mod: S$GLB,,, | Performed by: INTERNAL MEDICINE

## 2021-03-03 PROCEDURE — 99214 OFFICE O/P EST MOD 30 MIN: CPT | Mod: S$GLB,,, | Performed by: INTERNAL MEDICINE

## 2021-03-03 PROCEDURE — 1126F PR PAIN SEVERITY QUANTIFIED, NO PAIN PRESENT: ICD-10-PCS | Mod: S$GLB,,, | Performed by: INTERNAL MEDICINE

## 2021-03-03 PROCEDURE — 1159F PR MEDICATION LIST DOCUMENTED IN MEDICAL RECORD: ICD-10-PCS | Mod: S$GLB,,, | Performed by: INTERNAL MEDICINE

## 2021-03-03 PROCEDURE — 3288F PR FALLS RISK ASSESSMENT DOCUMENTED: ICD-10-PCS | Mod: S$GLB,,, | Performed by: INTERNAL MEDICINE

## 2021-03-05 ENCOUNTER — PATIENT MESSAGE (OUTPATIENT)
Dept: HEMATOLOGY/ONCOLOGY | Facility: CLINIC | Age: 81
End: 2021-03-05

## 2021-03-19 ENCOUNTER — TELEPHONE (OUTPATIENT)
Dept: AUDIOLOGY | Facility: CLINIC | Age: 81
End: 2021-03-19

## 2021-03-25 ENCOUNTER — CLINICAL SUPPORT (OUTPATIENT)
Dept: CARDIOLOGY | Facility: CLINIC | Age: 81
End: 2021-03-25
Payer: MEDICARE

## 2021-03-25 DIAGNOSIS — R91.8 LUNG NODULES: ICD-10-CM

## 2021-03-25 DIAGNOSIS — R94.31 NONSPECIFIC ABNORMAL ELECTROCARDIOGRAM (ECG) (EKG): ICD-10-CM

## 2021-03-25 DIAGNOSIS — I49.49 OTHER PREMATURE DEPOLARIZATION: ICD-10-CM

## 2021-03-25 PROCEDURE — 93000 ELECTROCARDIOGRAM COMPLETE: CPT | Mod: S$GLB,,, | Performed by: INTERNAL MEDICINE

## 2021-03-25 PROCEDURE — 93000 EKG 12-LEAD: ICD-10-PCS | Mod: S$GLB,,, | Performed by: INTERNAL MEDICINE

## 2021-05-09 DIAGNOSIS — E03.9 HYPOTHYROIDISM, UNSPECIFIED TYPE: ICD-10-CM

## 2021-05-11 ENCOUNTER — PATIENT MESSAGE (OUTPATIENT)
Dept: FAMILY MEDICINE | Facility: CLINIC | Age: 81
End: 2021-05-11

## 2021-05-11 RX ORDER — LEVOTHYROXINE SODIUM 75 UG/1
75 TABLET ORAL
Qty: 90 TABLET | Refills: 3 | Status: SHIPPED | OUTPATIENT
Start: 2021-05-11 | End: 2022-01-01 | Stop reason: SDUPTHER

## 2021-05-12 ENCOUNTER — PATIENT MESSAGE (OUTPATIENT)
Dept: FAMILY MEDICINE | Facility: CLINIC | Age: 81
End: 2021-05-12

## 2021-05-12 DIAGNOSIS — E03.9 HYPOTHYROIDISM, UNSPECIFIED TYPE: ICD-10-CM

## 2021-05-14 RX ORDER — LEVOTHYROXINE SODIUM 75 UG/1
TABLET ORAL
Qty: 90 TABLET | Refills: 0 | OUTPATIENT
Start: 2021-05-14

## 2021-05-20 ENCOUNTER — LAB VISIT (OUTPATIENT)
Dept: LAB | Facility: HOSPITAL | Age: 81
End: 2021-05-20
Attending: FAMILY MEDICINE
Payer: MEDICARE

## 2021-05-20 DIAGNOSIS — J47.9 BRONCHIECTASIS WITHOUT COMPLICATION: ICD-10-CM

## 2021-05-20 PROCEDURE — 87205 SMEAR GRAM STAIN: CPT | Performed by: INTERNAL MEDICINE

## 2021-05-20 PROCEDURE — 87206 SMEAR FLUORESCENT/ACID STAI: CPT | Mod: 91 | Performed by: INTERNAL MEDICINE

## 2021-05-20 PROCEDURE — 87077 CULTURE AEROBIC IDENTIFY: CPT | Performed by: INTERNAL MEDICINE

## 2021-05-20 PROCEDURE — 87186 SC STD MICRODIL/AGAR DIL: CPT | Performed by: INTERNAL MEDICINE

## 2021-05-20 PROCEDURE — 87070 CULTURE OTHR SPECIMN AEROBIC: CPT | Performed by: INTERNAL MEDICINE

## 2021-05-20 PROCEDURE — 87116 MYCOBACTERIA CULTURE: CPT | Performed by: INTERNAL MEDICINE

## 2021-05-20 PROCEDURE — 87015 SPECIMEN INFECT AGNT CONCNTJ: CPT | Mod: 59 | Performed by: INTERNAL MEDICINE

## 2021-05-24 LAB
BACTERIA SPEC AEROBE CULT: ABNORMAL
BACTERIA SPEC AEROBE CULT: ABNORMAL
GRAM STN SPEC: ABNORMAL

## 2021-06-02 PROBLEM — R35.0 URINARY FREQUENCY: Status: ACTIVE | Noted: 2021-06-02

## 2021-06-02 PROBLEM — G47.10 EXCESSIVE SLEEPINESS: Status: ACTIVE | Noted: 2021-06-02

## 2021-06-21 ENCOUNTER — LAB VISIT (OUTPATIENT)
Dept: LAB | Facility: HOSPITAL | Age: 81
End: 2021-06-21
Attending: FAMILY MEDICINE
Payer: MEDICARE

## 2021-06-21 DIAGNOSIS — E03.9 HYPOTHYROIDISM, UNSPECIFIED TYPE: ICD-10-CM

## 2021-06-21 LAB — TSH SERPL DL<=0.005 MIU/L-ACNC: 0.72 UIU/ML (ref 0.4–4)

## 2021-06-21 PROCEDURE — 36415 COLL VENOUS BLD VENIPUNCTURE: CPT | Mod: PO | Performed by: FAMILY MEDICINE

## 2021-06-21 PROCEDURE — 84443 ASSAY THYROID STIM HORMONE: CPT | Performed by: FAMILY MEDICINE

## 2021-06-24 ENCOUNTER — PATIENT MESSAGE (OUTPATIENT)
Dept: UROLOGY | Facility: CLINIC | Age: 81
End: 2021-06-24

## 2021-06-24 ENCOUNTER — TELEPHONE (OUTPATIENT)
Dept: UROLOGY | Facility: CLINIC | Age: 81
End: 2021-06-24

## 2021-06-28 ENCOUNTER — OFFICE VISIT (OUTPATIENT)
Dept: FAMILY MEDICINE | Facility: CLINIC | Age: 81
End: 2021-06-28
Payer: MEDICARE

## 2021-06-28 VITALS
SYSTOLIC BLOOD PRESSURE: 92 MMHG | DIASTOLIC BLOOD PRESSURE: 50 MMHG | OXYGEN SATURATION: 97 % | TEMPERATURE: 98 F | BODY MASS INDEX: 15.19 KG/M2 | RESPIRATION RATE: 20 BRPM | WEIGHT: 97 LBS | HEART RATE: 132 BPM

## 2021-06-28 DIAGNOSIS — E44.0 MALNUTRITION OF MODERATE DEGREE: ICD-10-CM

## 2021-06-28 DIAGNOSIS — E03.9 HYPOTHYROIDISM, UNSPECIFIED TYPE: Primary | ICD-10-CM

## 2021-06-28 DIAGNOSIS — A31.9 ATYPICAL MYCOBACTERIAL INFECTION: ICD-10-CM

## 2021-06-28 PROBLEM — A31.0 MYCOBACTERIUM AVIUM INFECTION: Status: RESOLVED | Noted: 2017-08-14 | Resolved: 2021-06-28

## 2021-06-28 PROBLEM — R35.0 URINARY FREQUENCY: Status: RESOLVED | Noted: 2021-06-02 | Resolved: 2021-06-28

## 2021-06-28 PROCEDURE — 1101F PT FALLS ASSESS-DOCD LE1/YR: CPT | Mod: S$GLB,,, | Performed by: FAMILY MEDICINE

## 2021-06-28 PROCEDURE — 1126F PR PAIN SEVERITY QUANTIFIED, NO PAIN PRESENT: ICD-10-PCS | Mod: S$GLB,,, | Performed by: FAMILY MEDICINE

## 2021-06-28 PROCEDURE — 1159F PR MEDICATION LIST DOCUMENTED IN MEDICAL RECORD: ICD-10-PCS | Mod: S$GLB,,, | Performed by: FAMILY MEDICINE

## 2021-06-28 PROCEDURE — 1101F PR PT FALLS ASSESS DOC 0-1 FALLS W/OUT INJ PAST YR: ICD-10-PCS | Mod: S$GLB,,, | Performed by: FAMILY MEDICINE

## 2021-06-28 PROCEDURE — 1159F MED LIST DOCD IN RCRD: CPT | Mod: S$GLB,,, | Performed by: FAMILY MEDICINE

## 2021-06-28 PROCEDURE — 3288F FALL RISK ASSESSMENT DOCD: CPT | Mod: S$GLB,,, | Performed by: FAMILY MEDICINE

## 2021-06-28 PROCEDURE — 99212 OFFICE O/P EST SF 10 MIN: CPT | Mod: S$GLB,,, | Performed by: FAMILY MEDICINE

## 2021-06-28 PROCEDURE — 99999 PR PBB SHADOW E&M-EST. PATIENT-LVL III: ICD-10-PCS | Mod: PBBFAC,,, | Performed by: FAMILY MEDICINE

## 2021-06-28 PROCEDURE — 99999 PR PBB SHADOW E&M-EST. PATIENT-LVL III: CPT | Mod: PBBFAC,,, | Performed by: FAMILY MEDICINE

## 2021-06-28 PROCEDURE — 99212 PR OFFICE/OUTPT VISIT, EST, LEVL II, 10-19 MIN: ICD-10-PCS | Mod: S$GLB,,, | Performed by: FAMILY MEDICINE

## 2021-06-28 PROCEDURE — 3288F PR FALLS RISK ASSESSMENT DOCUMENTED: ICD-10-PCS | Mod: S$GLB,,, | Performed by: FAMILY MEDICINE

## 2021-06-28 PROCEDURE — 1126F AMNT PAIN NOTED NONE PRSNT: CPT | Mod: S$GLB,,, | Performed by: FAMILY MEDICINE

## 2021-07-14 LAB
ACID FAST MOD KINY STN SPEC: NORMAL
ACID FAST MOD KINY STN SPEC: NORMAL
MYCOBACTERIUM SPEC QL CULT: NORMAL
MYCOBACTERIUM SPEC QL CULT: NORMAL

## 2021-09-07 ENCOUNTER — HOSPITAL ENCOUNTER (OUTPATIENT)
Dept: RADIOLOGY | Facility: HOSPITAL | Age: 81
Discharge: HOME | End: 2021-09-07
Attending: INTERNAL MEDICINE
Payer: MEDICARE

## 2021-09-07 DIAGNOSIS — K86.2 CYSTIC MASS OF PANCREAS: ICD-10-CM

## 2021-09-07 PROCEDURE — 74177 CT ABD & PELVIS W/CONTRAST: CPT | Mod: 26,,, | Performed by: RADIOLOGY

## 2021-09-07 PROCEDURE — A9698 NON-RAD CONTRAST MATERIALNOC: HCPCS | Mod: PO | Performed by: INTERNAL MEDICINE

## 2021-09-07 PROCEDURE — 74177 CT ABD & PELVIS W/CONTRAST: CPT | Mod: TC,PO

## 2021-09-07 PROCEDURE — 71260 CT CHEST ABDOMEN PELVIS WITH CONTRAST (XPD): ICD-10-PCS | Mod: 26,,, | Performed by: RADIOLOGY

## 2021-09-07 PROCEDURE — 71260 CT THORAX DX C+: CPT | Mod: TC,PO

## 2021-09-07 PROCEDURE — 25500020 PHARM REV CODE 255: Mod: PO | Performed by: INTERNAL MEDICINE

## 2021-09-07 PROCEDURE — 74177 CT CHEST ABDOMEN PELVIS WITH CONTRAST (XPD): ICD-10-PCS | Mod: 26,,, | Performed by: RADIOLOGY

## 2021-09-07 PROCEDURE — 71260 CT THORAX DX C+: CPT | Mod: 26,,, | Performed by: RADIOLOGY

## 2021-09-07 RX ADMIN — IOHEXOL 1000 ML: 9 SOLUTION ORAL at 09:09

## 2021-09-07 RX ADMIN — IOHEXOL 75 ML: 350 INJECTION, SOLUTION INTRAVENOUS at 09:09

## 2021-09-08 ENCOUNTER — OFFICE VISIT (OUTPATIENT)
Dept: HEMATOLOGY/ONCOLOGY | Facility: CLINIC | Age: 81
End: 2021-09-08
Payer: MEDICARE

## 2021-09-08 ENCOUNTER — PATIENT MESSAGE (OUTPATIENT)
Dept: HEMATOLOGY/ONCOLOGY | Facility: CLINIC | Age: 81
End: 2021-09-08

## 2021-09-08 VITALS
BODY MASS INDEX: 14.78 KG/M2 | HEIGHT: 67 IN | TEMPERATURE: 98 F | DIASTOLIC BLOOD PRESSURE: 69 MMHG | SYSTOLIC BLOOD PRESSURE: 102 MMHG | WEIGHT: 94.13 LBS | HEART RATE: 81 BPM | OXYGEN SATURATION: 98 % | RESPIRATION RATE: 18 BRPM

## 2021-09-08 DIAGNOSIS — A31.9 ATYPICAL MYCOBACTERIAL INFECTION: ICD-10-CM

## 2021-09-08 DIAGNOSIS — K86.2 CYSTIC MASS OF PANCREAS: Primary | ICD-10-CM

## 2021-09-08 DIAGNOSIS — R97.8 OTHER ABNORMAL TUMOR MARKERS: ICD-10-CM

## 2021-09-08 DIAGNOSIS — R63.4 WEIGHT LOSS: ICD-10-CM

## 2021-09-08 PROCEDURE — 1160F PR REVIEW ALL MEDS BY PRESCRIBER/CLIN PHARMACIST DOCUMENTED: ICD-10-PCS | Mod: S$GLB,,, | Performed by: INTERNAL MEDICINE

## 2021-09-08 PROCEDURE — 99999 PR PBB SHADOW E&M-EST. PATIENT-LVL III: ICD-10-PCS | Mod: PBBFAC,,, | Performed by: INTERNAL MEDICINE

## 2021-09-08 PROCEDURE — 3288F FALL RISK ASSESSMENT DOCD: CPT | Mod: S$GLB,,, | Performed by: INTERNAL MEDICINE

## 2021-09-08 PROCEDURE — 3288F PR FALLS RISK ASSESSMENT DOCUMENTED: ICD-10-PCS | Mod: S$GLB,,, | Performed by: INTERNAL MEDICINE

## 2021-09-08 PROCEDURE — 3078F DIAST BP <80 MM HG: CPT | Mod: S$GLB,,, | Performed by: INTERNAL MEDICINE

## 2021-09-08 PROCEDURE — 99214 PR OFFICE/OUTPT VISIT, EST, LEVL IV, 30-39 MIN: ICD-10-PCS | Mod: S$GLB,,, | Performed by: INTERNAL MEDICINE

## 2021-09-08 PROCEDURE — 1160F RVW MEDS BY RX/DR IN RCRD: CPT | Mod: S$GLB,,, | Performed by: INTERNAL MEDICINE

## 2021-09-08 PROCEDURE — 1159F PR MEDICATION LIST DOCUMENTED IN MEDICAL RECORD: ICD-10-PCS | Mod: S$GLB,,, | Performed by: INTERNAL MEDICINE

## 2021-09-08 PROCEDURE — 1126F AMNT PAIN NOTED NONE PRSNT: CPT | Mod: S$GLB,,, | Performed by: INTERNAL MEDICINE

## 2021-09-08 PROCEDURE — 1126F PR PAIN SEVERITY QUANTIFIED, NO PAIN PRESENT: ICD-10-PCS | Mod: S$GLB,,, | Performed by: INTERNAL MEDICINE

## 2021-09-08 PROCEDURE — 1159F MED LIST DOCD IN RCRD: CPT | Mod: S$GLB,,, | Performed by: INTERNAL MEDICINE

## 2021-09-08 PROCEDURE — 99999 PR PBB SHADOW E&M-EST. PATIENT-LVL III: CPT | Mod: PBBFAC,,, | Performed by: INTERNAL MEDICINE

## 2021-09-08 PROCEDURE — 3074F PR MOST RECENT SYSTOLIC BLOOD PRESSURE < 130 MM HG: ICD-10-PCS | Mod: S$GLB,,, | Performed by: INTERNAL MEDICINE

## 2021-09-08 PROCEDURE — 3074F SYST BP LT 130 MM HG: CPT | Mod: S$GLB,,, | Performed by: INTERNAL MEDICINE

## 2021-09-08 PROCEDURE — 3078F PR MOST RECENT DIASTOLIC BLOOD PRESSURE < 80 MM HG: ICD-10-PCS | Mod: S$GLB,,, | Performed by: INTERNAL MEDICINE

## 2021-09-08 PROCEDURE — 1101F PT FALLS ASSESS-DOCD LE1/YR: CPT | Mod: S$GLB,,, | Performed by: INTERNAL MEDICINE

## 2021-09-08 PROCEDURE — 1101F PR PT FALLS ASSESS DOC 0-1 FALLS W/OUT INJ PAST YR: ICD-10-PCS | Mod: S$GLB,,, | Performed by: INTERNAL MEDICINE

## 2021-09-08 PROCEDURE — 99214 OFFICE O/P EST MOD 30 MIN: CPT | Mod: S$GLB,,, | Performed by: INTERNAL MEDICINE

## 2021-10-07 ENCOUNTER — IMMUNIZATION (OUTPATIENT)
Dept: FAMILY MEDICINE | Facility: CLINIC | Age: 81
End: 2021-10-07
Payer: MEDICARE

## 2021-10-07 DIAGNOSIS — Z23 NEED FOR VACCINATION: Primary | ICD-10-CM

## 2021-10-07 PROCEDURE — 0003A COVID-19, MRNA, LNP-S, PF, 30 MCG/0.3 ML DOSE VACCINE: CPT | Mod: PBBFAC | Performed by: FAMILY MEDICINE

## 2021-10-07 PROCEDURE — 91300 COVID-19, MRNA, LNP-S, PF, 30 MCG/0.3 ML DOSE VACCINE: CPT | Mod: PBBFAC | Performed by: FAMILY MEDICINE

## 2021-11-08 ENCOUNTER — LAB VISIT (OUTPATIENT)
Dept: LAB | Facility: HOSPITAL | Age: 81
End: 2021-11-08
Attending: FAMILY MEDICINE
Payer: MEDICARE

## 2021-11-08 DIAGNOSIS — E03.9 HYPOTHYROIDISM, UNSPECIFIED TYPE: ICD-10-CM

## 2021-11-08 LAB
ALBUMIN SERPL BCP-MCNC: 3.6 G/DL (ref 3.5–5.2)
ALP SERPL-CCNC: 97 U/L (ref 55–135)
ALT SERPL W/O P-5'-P-CCNC: 12 U/L (ref 10–44)
ANION GAP SERPL CALC-SCNC: 6 MMOL/L (ref 8–16)
AST SERPL-CCNC: 19 U/L (ref 10–40)
BASOPHILS # BLD AUTO: 0.03 K/UL (ref 0–0.2)
BASOPHILS NFR BLD: 0.5 % (ref 0–1.9)
BILIRUB SERPL-MCNC: 0.8 MG/DL (ref 0.1–1)
BUN SERPL-MCNC: 17 MG/DL (ref 8–23)
CALCIUM SERPL-MCNC: 9.8 MG/DL (ref 8.7–10.5)
CHLORIDE SERPL-SCNC: 97 MMOL/L (ref 95–110)
CHOLEST SERPL-MCNC: 161 MG/DL (ref 120–199)
CHOLEST/HDLC SERPL: 3.2 {RATIO} (ref 2–5)
CO2 SERPL-SCNC: 32 MMOL/L (ref 23–29)
CREAT SERPL-MCNC: 0.7 MG/DL (ref 0.5–1.4)
DIFFERENTIAL METHOD: ABNORMAL
EOSINOPHIL # BLD AUTO: 0.1 K/UL (ref 0–0.5)
EOSINOPHIL NFR BLD: 2.2 % (ref 0–8)
ERYTHROCYTE [DISTWIDTH] IN BLOOD BY AUTOMATED COUNT: 13.1 % (ref 11.5–14.5)
EST. GFR  (AFRICAN AMERICAN): >60 ML/MIN/1.73 M^2
EST. GFR  (NON AFRICAN AMERICAN): >60 ML/MIN/1.73 M^2
GLUCOSE SERPL-MCNC: 93 MG/DL (ref 70–110)
HCT VFR BLD AUTO: 37 % (ref 40–54)
HDLC SERPL-MCNC: 51 MG/DL (ref 40–75)
HDLC SERPL: 31.7 % (ref 20–50)
HGB BLD-MCNC: 11.8 G/DL (ref 14–18)
IMM GRANULOCYTES # BLD AUTO: 0.01 K/UL (ref 0–0.04)
IMM GRANULOCYTES NFR BLD AUTO: 0.2 % (ref 0–0.5)
LDLC SERPL CALC-MCNC: 93.8 MG/DL (ref 63–159)
LYMPHOCYTES # BLD AUTO: 1.4 K/UL (ref 1–4.8)
LYMPHOCYTES NFR BLD: 26 % (ref 18–48)
MCH RBC QN AUTO: 32.5 PG (ref 27–31)
MCHC RBC AUTO-ENTMCNC: 31.9 G/DL (ref 32–36)
MCV RBC AUTO: 102 FL (ref 82–98)
MONOCYTES # BLD AUTO: 0.4 K/UL (ref 0.3–1)
MONOCYTES NFR BLD: 6.5 % (ref 4–15)
NEUTROPHILS # BLD AUTO: 3.6 K/UL (ref 1.8–7.7)
NEUTROPHILS NFR BLD: 64.6 % (ref 38–73)
NONHDLC SERPL-MCNC: 110 MG/DL
NRBC BLD-RTO: 0 /100 WBC
PLATELET # BLD AUTO: 299 K/UL (ref 150–450)
PMV BLD AUTO: 9.5 FL (ref 9.2–12.9)
POTASSIUM SERPL-SCNC: 4 MMOL/L (ref 3.5–5.1)
PROT SERPL-MCNC: 8.1 G/DL (ref 6–8.4)
RBC # BLD AUTO: 3.63 M/UL (ref 4.6–6.2)
SODIUM SERPL-SCNC: 135 MMOL/L (ref 136–145)
TRIGL SERPL-MCNC: 81 MG/DL (ref 30–150)
TSH SERPL DL<=0.005 MIU/L-ACNC: 1.7 UIU/ML (ref 0.4–4)
WBC # BLD AUTO: 5.53 K/UL (ref 3.9–12.7)

## 2021-11-08 PROCEDURE — 80053 COMPREHEN METABOLIC PANEL: CPT | Performed by: FAMILY MEDICINE

## 2021-11-08 PROCEDURE — 80061 LIPID PANEL: CPT | Performed by: FAMILY MEDICINE

## 2021-11-08 PROCEDURE — 36415 COLL VENOUS BLD VENIPUNCTURE: CPT | Mod: PO | Performed by: FAMILY MEDICINE

## 2021-11-08 PROCEDURE — 84443 ASSAY THYROID STIM HORMONE: CPT | Performed by: FAMILY MEDICINE

## 2021-11-08 PROCEDURE — 85025 COMPLETE CBC W/AUTO DIFF WBC: CPT | Performed by: FAMILY MEDICINE

## 2021-11-15 PROBLEM — J44.9 CHRONIC OBSTRUCTIVE PULMONARY DISEASE: Status: ACTIVE | Noted: 2021-01-01

## 2022-01-01 ENCOUNTER — DOCUMENT SCAN (OUTPATIENT)
Dept: HOME HEALTH SERVICES | Facility: HOSPITAL | Age: 82
End: 2022-01-01
Payer: MEDICARE

## 2022-01-01 ENCOUNTER — OFFICE VISIT (OUTPATIENT)
Dept: CARDIOLOGY | Facility: CLINIC | Age: 82
End: 2022-01-01
Payer: MEDICARE

## 2022-01-01 ENCOUNTER — TELEPHONE (OUTPATIENT)
Dept: CARDIOLOGY | Facility: CLINIC | Age: 82
End: 2022-01-01
Payer: MEDICARE

## 2022-01-01 ENCOUNTER — DOCUMENTATION ONLY (OUTPATIENT)
Dept: HEMATOLOGY/ONCOLOGY | Facility: CLINIC | Age: 82
End: 2022-01-01
Payer: MEDICARE

## 2022-01-01 ENCOUNTER — NURSE TRIAGE (OUTPATIENT)
Dept: ADMINISTRATIVE | Facility: CLINIC | Age: 82
End: 2022-01-01
Payer: MEDICARE

## 2022-01-01 ENCOUNTER — OFFICE VISIT (OUTPATIENT)
Dept: URGENT CARE | Facility: CLINIC | Age: 82
End: 2022-01-01
Payer: MEDICARE

## 2022-01-01 ENCOUNTER — PATIENT MESSAGE (OUTPATIENT)
Dept: FAMILY MEDICINE | Facility: CLINIC | Age: 82
End: 2022-01-01
Payer: MEDICARE

## 2022-01-01 ENCOUNTER — TELEPHONE (OUTPATIENT)
Dept: HEMATOLOGY/ONCOLOGY | Facility: CLINIC | Age: 82
End: 2022-01-01
Payer: MEDICARE

## 2022-01-01 ENCOUNTER — EXTERNAL HOME HEALTH (OUTPATIENT)
Dept: HOME HEALTH SERVICES | Facility: HOSPITAL | Age: 82
End: 2022-01-01
Payer: MEDICARE

## 2022-01-01 ENCOUNTER — PATIENT MESSAGE (OUTPATIENT)
Dept: HEMATOLOGY/ONCOLOGY | Facility: CLINIC | Age: 82
End: 2022-01-01
Payer: MEDICARE

## 2022-01-01 ENCOUNTER — PATIENT MESSAGE (OUTPATIENT)
Dept: SMOKING CESSATION | Facility: CLINIC | Age: 82
End: 2022-01-01
Payer: MEDICARE

## 2022-01-01 ENCOUNTER — TELEPHONE (OUTPATIENT)
Dept: FAMILY MEDICINE | Facility: CLINIC | Age: 82
End: 2022-01-01
Payer: MEDICARE

## 2022-01-01 ENCOUNTER — HOSPITAL ENCOUNTER (OUTPATIENT)
Dept: RADIOLOGY | Facility: HOSPITAL | Age: 82
Discharge: HOME | End: 2022-06-07
Attending: INTERNAL MEDICINE
Payer: MEDICARE

## 2022-01-01 ENCOUNTER — LAB VISIT (OUTPATIENT)
Dept: LAB | Facility: HOSPITAL | Age: 82
End: 2022-01-01
Attending: FAMILY MEDICINE
Payer: MEDICARE

## 2022-01-01 ENCOUNTER — CLINICAL SUPPORT (OUTPATIENT)
Dept: CARDIOLOGY | Facility: HOSPITAL | Age: 82
End: 2022-01-01
Attending: INTERNAL MEDICINE
Payer: MEDICARE

## 2022-01-01 ENCOUNTER — OFFICE VISIT (OUTPATIENT)
Dept: FAMILY MEDICINE | Facility: CLINIC | Age: 82
End: 2022-01-01
Payer: MEDICARE

## 2022-01-01 ENCOUNTER — HOSPITAL ENCOUNTER (OUTPATIENT)
Dept: RADIOLOGY | Facility: HOSPITAL | Age: 82
Discharge: HOME | End: 2022-10-04
Attending: NURSE PRACTITIONER
Payer: MEDICARE

## 2022-01-01 VITALS
SYSTOLIC BLOOD PRESSURE: 101 MMHG | SYSTOLIC BLOOD PRESSURE: 141 MMHG | DIASTOLIC BLOOD PRESSURE: 60 MMHG | HEART RATE: 75 BPM | WEIGHT: 99.88 LBS | HEART RATE: 95 BPM | BODY MASS INDEX: 15.67 KG/M2 | WEIGHT: 104.5 LBS | HEIGHT: 67 IN | BODY MASS INDEX: 16.4 KG/M2 | DIASTOLIC BLOOD PRESSURE: 81 MMHG | HEIGHT: 67 IN

## 2022-01-01 VITALS
HEIGHT: 67 IN | TEMPERATURE: 98 F | WEIGHT: 94 LBS | BODY MASS INDEX: 14.75 KG/M2 | OXYGEN SATURATION: 95 % | SYSTOLIC BLOOD PRESSURE: 78 MMHG | DIASTOLIC BLOOD PRESSURE: 50 MMHG | HEART RATE: 67 BPM | RESPIRATION RATE: 16 BRPM

## 2022-01-01 DIAGNOSIS — R91.8 LUNG NODULES: Primary | ICD-10-CM

## 2022-01-01 DIAGNOSIS — I48.91 ATRIAL FIBRILLATION WITH RVR: ICD-10-CM

## 2022-01-01 DIAGNOSIS — D53.9 NUTRITIONAL ANEMIA: ICD-10-CM

## 2022-01-01 DIAGNOSIS — I34.0 NON-RHEUMATIC MITRAL REGURGITATION: ICD-10-CM

## 2022-01-01 DIAGNOSIS — E44.0 MALNUTRITION OF MODERATE DEGREE: ICD-10-CM

## 2022-01-01 DIAGNOSIS — I36.9 TRICUSPID VALVE DISORDERS, NON-RHEUMATIC: ICD-10-CM

## 2022-01-01 DIAGNOSIS — U07.1 COVID-19 VIRUS DETECTED: ICD-10-CM

## 2022-01-01 DIAGNOSIS — A31.9 ATYPICAL MYCOBACTERIAL INFECTION: ICD-10-CM

## 2022-01-01 DIAGNOSIS — J47.9 BRONCHIECTASIS WITHOUT COMPLICATION: ICD-10-CM

## 2022-01-01 DIAGNOSIS — J90 PLEURAL EFFUSION: ICD-10-CM

## 2022-01-01 DIAGNOSIS — R74.8 ELEVATED LIVER ENZYMES: ICD-10-CM

## 2022-01-01 DIAGNOSIS — K86.2 CYSTIC MASS OF PANCREAS: ICD-10-CM

## 2022-01-01 DIAGNOSIS — I70.0 AORTIC ATHEROSCLEROSIS: ICD-10-CM

## 2022-01-01 DIAGNOSIS — J42 CHRONIC BRONCHITIS, UNSPECIFIED CHRONIC BRONCHITIS TYPE: ICD-10-CM

## 2022-01-01 DIAGNOSIS — U07.1 COVID-19 VIRUS INFECTION: Primary | ICD-10-CM

## 2022-01-01 DIAGNOSIS — E03.9 HYPOTHYROIDISM, UNSPECIFIED TYPE: ICD-10-CM

## 2022-01-01 DIAGNOSIS — R06.02 SHORTNESS OF BREATH: Primary | ICD-10-CM

## 2022-01-01 DIAGNOSIS — J44.9 CHRONIC OBSTRUCTIVE PULMONARY DISEASE, UNSPECIFIED COPD TYPE: ICD-10-CM

## 2022-01-01 DIAGNOSIS — I36.1 NON-RHEUMATIC TRICUSPID VALVE INSUFFICIENCY: ICD-10-CM

## 2022-01-01 DIAGNOSIS — E43 SEVERE MALNUTRITION: ICD-10-CM

## 2022-01-01 DIAGNOSIS — R05.9 COUGH: ICD-10-CM

## 2022-01-01 DIAGNOSIS — R10.9 ABDOMINAL PAIN, UNSPECIFIED ABDOMINAL LOCATION: Primary | ICD-10-CM

## 2022-01-01 LAB
ALBUMIN SERPL BCP-MCNC: 3.2 G/DL (ref 3.5–5.2)
ALP SERPL-CCNC: 82 U/L (ref 55–135)
ALT SERPL W/O P-5'-P-CCNC: 11 U/L (ref 10–44)
ANION GAP SERPL CALC-SCNC: 10 MMOL/L (ref 8–16)
AST SERPL-CCNC: 19 U/L (ref 10–40)
BASOPHILS # BLD AUTO: 0.03 K/UL (ref 0–0.2)
BASOPHILS NFR BLD: 0.6 % (ref 0–1.9)
BILIRUB SERPL-MCNC: 0.9 MG/DL (ref 0.1–1)
BUN SERPL-MCNC: 24 MG/DL (ref 8–23)
CALCIUM SERPL-MCNC: 9.1 MG/DL (ref 8.7–10.5)
CHLORIDE SERPL-SCNC: 100 MMOL/L (ref 95–110)
CO2 SERPL-SCNC: 28 MMOL/L (ref 23–29)
CREAT SERPL-MCNC: 0.7 MG/DL (ref 0.5–1.4)
CTP QC/QA: YES
DIFFERENTIAL METHOD: ABNORMAL
EOSINOPHIL # BLD AUTO: 0.1 K/UL (ref 0–0.5)
EOSINOPHIL NFR BLD: 1.2 % (ref 0–8)
ERYTHROCYTE [DISTWIDTH] IN BLOOD BY AUTOMATED COUNT: 12.8 % (ref 11.5–14.5)
EST. GFR  (AFRICAN AMERICAN): >60 ML/MIN/1.73 M^2
EST. GFR  (NON AFRICAN AMERICAN): >60 ML/MIN/1.73 M^2
FERRITIN SERPL-MCNC: 299 NG/ML (ref 20–300)
FOLATE SERPL-MCNC: 18.4 NG/ML (ref 4–24)
GLUCOSE SERPL-MCNC: 90 MG/DL (ref 70–110)
HCT VFR BLD AUTO: 35.2 % (ref 40–54)
HGB BLD-MCNC: 11.4 G/DL (ref 14–18)
IMM GRANULOCYTES # BLD AUTO: 0.01 K/UL (ref 0–0.04)
IMM GRANULOCYTES NFR BLD AUTO: 0.2 % (ref 0–0.5)
IRON SERPL-MCNC: 67 UG/DL (ref 45–160)
LYMPHOCYTES # BLD AUTO: 1 K/UL (ref 1–4.8)
LYMPHOCYTES NFR BLD: 19.6 % (ref 18–48)
MCH RBC QN AUTO: 31.5 PG (ref 27–31)
MCHC RBC AUTO-ENTMCNC: 32.4 G/DL (ref 32–36)
MCV RBC AUTO: 97 FL (ref 82–98)
MONOCYTES # BLD AUTO: 0.4 K/UL (ref 0.3–1)
MONOCYTES NFR BLD: 7.7 % (ref 4–15)
NEUTROPHILS # BLD AUTO: 3.7 K/UL (ref 1.8–7.7)
NEUTROPHILS NFR BLD: 70.7 % (ref 38–73)
NRBC BLD-RTO: 0 /100 WBC
OHS CV EVENT MONITOR DAY: 0
OHS CV HOLTER LENGTH DECIMAL HOURS: 48
OHS CV HOLTER LENGTH HOURS: 48
OHS CV HOLTER LENGTH MINUTES: 0
OHS CV HOLTER SINUS AVERAGE HR: 114
OHS CV HOLTER SINUS MAX HR: 169
OHS CV HOLTER SINUS MIN HR: 67
PLATELET # BLD AUTO: 290 K/UL (ref 150–450)
PMV BLD AUTO: 9.7 FL (ref 9.2–12.9)
POTASSIUM SERPL-SCNC: 4.1 MMOL/L (ref 3.5–5.1)
PROT SERPL-MCNC: 7.3 G/DL (ref 6–8.4)
RBC # BLD AUTO: 3.62 M/UL (ref 4.6–6.2)
SARS-COV-2 RDRP RESP QL NAA+PROBE: POSITIVE
SATURATED IRON: 22 % (ref 20–50)
SODIUM SERPL-SCNC: 138 MMOL/L (ref 136–145)
TOTAL IRON BINDING CAPACITY: 306 UG/DL (ref 250–450)
TRANSFERRIN SERPL-MCNC: 207 MG/DL (ref 200–375)
TSH SERPL DL<=0.005 MIU/L-ACNC: 1.46 UIU/ML (ref 0.4–4)
VIT B12 SERPL-MCNC: 1004 PG/ML (ref 210–950)
WBC # BLD AUTO: 5.21 K/UL (ref 3.9–12.7)

## 2022-01-01 PROCEDURE — 1101F PR PT FALLS ASSESS DOC 0-1 FALLS W/OUT INJ PAST YR: ICD-10-PCS | Mod: CPTII,S$GLB,, | Performed by: INTERNAL MEDICINE

## 2022-01-01 PROCEDURE — 1111F PR DISCHARGE MEDS RECONCILED W/ CURRENT OUTPATIENT MED LIST: ICD-10-PCS | Mod: CPTII,95,, | Performed by: PHYSICIAN ASSISTANT

## 2022-01-01 PROCEDURE — 3288F FALL RISK ASSESSMENT DOCD: CPT | Mod: CPTII,S$GLB,, | Performed by: INTERNAL MEDICINE

## 2022-01-01 PROCEDURE — 84466 ASSAY OF TRANSFERRIN: CPT | Performed by: FAMILY MEDICINE

## 2022-01-01 PROCEDURE — G0180 MD CERTIFICATION HHA PATIENT: HCPCS | Mod: ,,, | Performed by: FAMILY MEDICINE

## 2022-01-01 PROCEDURE — 3079F PR MOST RECENT DIASTOLIC BLOOD PRESSURE 80-89 MM HG: ICD-10-PCS | Mod: CPTII,S$GLB,, | Performed by: INTERNAL MEDICINE

## 2022-01-01 PROCEDURE — 1159F PR MEDICATION LIST DOCUMENTED IN MEDICAL RECORD: ICD-10-PCS | Mod: CPTII,95,, | Performed by: PHYSICIAN ASSISTANT

## 2022-01-01 PROCEDURE — 1160F RVW MEDS BY RX/DR IN RCRD: CPT | Mod: CPTII,S$GLB,, | Performed by: INTERNAL MEDICINE

## 2022-01-01 PROCEDURE — 99213 OFFICE O/P EST LOW 20 MIN: CPT | Mod: 95,,, | Performed by: FAMILY MEDICINE

## 2022-01-01 PROCEDURE — 99214 OFFICE O/P EST MOD 30 MIN: CPT | Mod: S$GLB,,, | Performed by: INTERNAL MEDICINE

## 2022-01-01 PROCEDURE — 1159F MED LIST DOCD IN RCRD: CPT | Mod: CPTII,95,, | Performed by: PHYSICIAN ASSISTANT

## 2022-01-01 PROCEDURE — 99214 PR OFFICE/OUTPT VISIT, EST, LEVL IV, 30-39 MIN: ICD-10-PCS | Mod: S$GLB,,, | Performed by: INTERNAL MEDICINE

## 2022-01-01 PROCEDURE — 3078F DIAST BP <80 MM HG: CPT | Mod: CPTII,S$GLB,, | Performed by: INTERNAL MEDICINE

## 2022-01-01 PROCEDURE — 93227 HOLTER MONITOR - 48 HOUR (CUPID ONLY): ICD-10-PCS | Mod: ,,, | Performed by: INTERNAL MEDICINE

## 2022-01-01 PROCEDURE — 74177 CT CHEST ABDOMEN PELVIS WITH CONTRAST (XPD): ICD-10-PCS | Mod: 26,,, | Performed by: RADIOLOGY

## 2022-01-01 PROCEDURE — 1126F AMNT PAIN NOTED NONE PRSNT: CPT | Mod: CPTII,S$GLB,, | Performed by: INTERNAL MEDICINE

## 2022-01-01 PROCEDURE — 3288F PR FALLS RISK ASSESSMENT DOCUMENTED: ICD-10-PCS | Mod: CPTII,S$GLB,, | Performed by: INTERNAL MEDICINE

## 2022-01-01 PROCEDURE — 1159F PR MEDICATION LIST DOCUMENTED IN MEDICAL RECORD: ICD-10-PCS | Mod: CPTII,S$GLB,, | Performed by: PHYSICIAN ASSISTANT

## 2022-01-01 PROCEDURE — 1159F PR MEDICATION LIST DOCUMENTED IN MEDICAL RECORD: ICD-10-PCS | Mod: CPTII,S$GLB,, | Performed by: INTERNAL MEDICINE

## 2022-01-01 PROCEDURE — 3078F DIAST BP <80 MM HG: CPT | Mod: CPTII,S$GLB,, | Performed by: PHYSICIAN ASSISTANT

## 2022-01-01 PROCEDURE — 99213 OFFICE O/P EST LOW 20 MIN: CPT | Mod: 95,,, | Performed by: PHYSICIAN ASSISTANT

## 2022-01-01 PROCEDURE — 82746 ASSAY OF FOLIC ACID SERUM: CPT | Performed by: FAMILY MEDICINE

## 2022-01-01 PROCEDURE — 3074F PR MOST RECENT SYSTOLIC BLOOD PRESSURE < 130 MM HG: ICD-10-PCS | Mod: CPTII,S$GLB,, | Performed by: INTERNAL MEDICINE

## 2022-01-01 PROCEDURE — 1126F PR PAIN SEVERITY QUANTIFIED, NO PAIN PRESENT: ICD-10-PCS | Mod: CPTII,S$GLB,, | Performed by: PHYSICIAN ASSISTANT

## 2022-01-01 PROCEDURE — 99213 PR OFFICE/OUTPT VISIT, EST, LEVL III, 20-29 MIN: ICD-10-PCS | Mod: 95,,, | Performed by: PHYSICIAN ASSISTANT

## 2022-01-01 PROCEDURE — 1159F MED LIST DOCD IN RCRD: CPT | Mod: CPTII,S$GLB,, | Performed by: PHYSICIAN ASSISTANT

## 2022-01-01 PROCEDURE — U0002 COVID-19 LAB TEST NON-CDC: HCPCS | Mod: QW,S$GLB,, | Performed by: PHYSICIAN ASSISTANT

## 2022-01-01 PROCEDURE — 99999 PR PBB SHADOW E&M-EST. PATIENT-LVL III: CPT | Mod: PBBFAC,,, | Performed by: INTERNAL MEDICINE

## 2022-01-01 PROCEDURE — 1160F PR REVIEW ALL MEDS BY PRESCRIBER/CLIN PHARMACIST DOCUMENTED: ICD-10-PCS | Mod: CPTII,S$GLB,, | Performed by: PHYSICIAN ASSISTANT

## 2022-01-01 PROCEDURE — 25500020 PHARM REV CODE 255: Mod: PO | Performed by: INTERNAL MEDICINE

## 2022-01-01 PROCEDURE — 1126F PR PAIN SEVERITY QUANTIFIED, NO PAIN PRESENT: ICD-10-PCS | Mod: CPTII,S$GLB,, | Performed by: INTERNAL MEDICINE

## 2022-01-01 PROCEDURE — 1126F AMNT PAIN NOTED NONE PRSNT: CPT | Mod: CPTII,S$GLB,, | Performed by: PHYSICIAN ASSISTANT

## 2022-01-01 PROCEDURE — 1160F RVW MEDS BY RX/DR IN RCRD: CPT | Mod: CPTII,S$GLB,, | Performed by: PHYSICIAN ASSISTANT

## 2022-01-01 PROCEDURE — 99213 PR OFFICE/OUTPT VISIT, EST, LEVL III, 20-29 MIN: ICD-10-PCS | Mod: 95,,, | Performed by: FAMILY MEDICINE

## 2022-01-01 PROCEDURE — 74177 CT ABD & PELVIS W/CONTRAST: CPT | Mod: 26,,, | Performed by: RADIOLOGY

## 2022-01-01 PROCEDURE — 3078F PR MOST RECENT DIASTOLIC BLOOD PRESSURE < 80 MM HG: ICD-10-PCS | Mod: CPTII,S$GLB,, | Performed by: PHYSICIAN ASSISTANT

## 2022-01-01 PROCEDURE — 82728 ASSAY OF FERRITIN: CPT | Performed by: FAMILY MEDICINE

## 2022-01-01 PROCEDURE — 99213 PR OFFICE/OUTPT VISIT, EST, LEVL III, 20-29 MIN: ICD-10-PCS | Mod: S$GLB,,, | Performed by: PHYSICIAN ASSISTANT

## 2022-01-01 PROCEDURE — 74177 CT ABD & PELVIS W/CONTRAST: CPT | Mod: TC,PO

## 2022-01-01 PROCEDURE — 82607 VITAMIN B-12: CPT | Performed by: FAMILY MEDICINE

## 2022-01-01 PROCEDURE — 93226 XTRNL ECG REC<48 HR SCAN A/R: CPT | Mod: PO

## 2022-01-01 PROCEDURE — 99999 PR PBB SHADOW E&M-EST. PATIENT-LVL III: ICD-10-PCS | Mod: PBBFAC,,, | Performed by: INTERNAL MEDICINE

## 2022-01-01 PROCEDURE — 71260 CT THORAX DX C+: CPT | Mod: 26,,, | Performed by: RADIOLOGY

## 2022-01-01 PROCEDURE — 1159F MED LIST DOCD IN RCRD: CPT | Mod: CPTII,S$GLB,, | Performed by: INTERNAL MEDICINE

## 2022-01-01 PROCEDURE — 1160F PR REVIEW ALL MEDS BY PRESCRIBER/CLIN PHARMACIST DOCUMENTED: ICD-10-PCS | Mod: CPTII,S$GLB,, | Performed by: INTERNAL MEDICINE

## 2022-01-01 PROCEDURE — 85025 COMPLETE CBC W/AUTO DIFF WBC: CPT | Performed by: FAMILY MEDICINE

## 2022-01-01 PROCEDURE — 93227 XTRNL ECG REC<48 HR R&I: CPT | Mod: ,,, | Performed by: INTERNAL MEDICINE

## 2022-01-01 PROCEDURE — 71046 XR CHEST PA AND LATERAL: ICD-10-PCS | Mod: 26,,, | Performed by: RADIOLOGY

## 2022-01-01 PROCEDURE — 36415 COLL VENOUS BLD VENIPUNCTURE: CPT | Mod: PO | Performed by: FAMILY MEDICINE

## 2022-01-01 PROCEDURE — 80053 COMPREHEN METABOLIC PANEL: CPT | Performed by: FAMILY MEDICINE

## 2022-01-01 PROCEDURE — 1101F PT FALLS ASSESS-DOCD LE1/YR: CPT | Mod: CPTII,S$GLB,, | Performed by: INTERNAL MEDICINE

## 2022-01-01 PROCEDURE — U0002: ICD-10-PCS | Mod: QW,S$GLB,, | Performed by: PHYSICIAN ASSISTANT

## 2022-01-01 PROCEDURE — 71260 CT THORAX DX C+: CPT | Mod: TC,PO

## 2022-01-01 PROCEDURE — G0180 PR HOME HEALTH MD CERTIFICATION: ICD-10-PCS | Mod: ,,, | Performed by: FAMILY MEDICINE

## 2022-01-01 PROCEDURE — 71046 X-RAY EXAM CHEST 2 VIEWS: CPT | Mod: TC,FY,PO

## 2022-01-01 PROCEDURE — A9698 NON-RAD CONTRAST MATERIALNOC: HCPCS | Mod: PO | Performed by: INTERNAL MEDICINE

## 2022-01-01 PROCEDURE — 3077F SYST BP >= 140 MM HG: CPT | Mod: CPTII,S$GLB,, | Performed by: INTERNAL MEDICINE

## 2022-01-01 PROCEDURE — 99213 OFFICE O/P EST LOW 20 MIN: CPT | Mod: S$GLB,,, | Performed by: PHYSICIAN ASSISTANT

## 2022-01-01 PROCEDURE — 1160F RVW MEDS BY RX/DR IN RCRD: CPT | Mod: CPTII,95,, | Performed by: PHYSICIAN ASSISTANT

## 2022-01-01 PROCEDURE — 3077F PR MOST RECENT SYSTOLIC BLOOD PRESSURE >= 140 MM HG: ICD-10-PCS | Mod: CPTII,S$GLB,, | Performed by: INTERNAL MEDICINE

## 2022-01-01 PROCEDURE — 71046 X-RAY EXAM CHEST 2 VIEWS: CPT | Mod: 26,,, | Performed by: RADIOLOGY

## 2022-01-01 PROCEDURE — 3074F SYST BP LT 130 MM HG: CPT | Mod: CPTII,S$GLB,, | Performed by: INTERNAL MEDICINE

## 2022-01-01 PROCEDURE — 1111F DSCHRG MED/CURRENT MED MERGE: CPT | Mod: CPTII,95,, | Performed by: PHYSICIAN ASSISTANT

## 2022-01-01 PROCEDURE — 3074F SYST BP LT 130 MM HG: CPT | Mod: CPTII,S$GLB,, | Performed by: PHYSICIAN ASSISTANT

## 2022-01-01 PROCEDURE — 3078F PR MOST RECENT DIASTOLIC BLOOD PRESSURE < 80 MM HG: ICD-10-PCS | Mod: CPTII,S$GLB,, | Performed by: INTERNAL MEDICINE

## 2022-01-01 PROCEDURE — 3074F PR MOST RECENT SYSTOLIC BLOOD PRESSURE < 130 MM HG: ICD-10-PCS | Mod: CPTII,S$GLB,, | Performed by: PHYSICIAN ASSISTANT

## 2022-01-01 PROCEDURE — 84443 ASSAY THYROID STIM HORMONE: CPT | Performed by: FAMILY MEDICINE

## 2022-01-01 PROCEDURE — 71260 CT CHEST ABDOMEN PELVIS WITH CONTRAST (XPD): ICD-10-PCS | Mod: 26,,, | Performed by: RADIOLOGY

## 2022-01-01 PROCEDURE — 3079F DIAST BP 80-89 MM HG: CPT | Mod: CPTII,S$GLB,, | Performed by: INTERNAL MEDICINE

## 2022-01-01 PROCEDURE — 1160F PR REVIEW ALL MEDS BY PRESCRIBER/CLIN PHARMACIST DOCUMENTED: ICD-10-PCS | Mod: CPTII,95,, | Performed by: PHYSICIAN ASSISTANT

## 2022-01-01 RX ORDER — FUROSEMIDE 20 MG/1
20 TABLET ORAL DAILY
Qty: 30 TABLET | Refills: 0 | Status: SHIPPED | OUTPATIENT
Start: 2022-01-01 | End: 2022-01-01

## 2022-01-01 RX ORDER — DILTIAZEM HYDROCHLORIDE 60 MG/1
60 TABLET, FILM COATED ORAL EVERY 8 HOURS
Qty: 90 TABLET | Refills: 0 | Status: SHIPPED | OUTPATIENT
Start: 2022-01-01 | End: 2022-01-01

## 2022-01-01 RX ORDER — LEVOTHYROXINE SODIUM 75 UG/1
75 TABLET ORAL
Qty: 90 TABLET | Refills: 1 | Status: SHIPPED | OUTPATIENT
Start: 2022-01-01

## 2022-01-01 RX ORDER — SUCRALFATE 1 G/1
1 TABLET ORAL
Qty: 120 TABLET | Refills: 1 | Status: SHIPPED | OUTPATIENT
Start: 2022-01-01

## 2022-01-01 RX ORDER — FUROSEMIDE 20 MG/1
20 TABLET ORAL DAILY
Qty: 30 TABLET | Refills: 0 | Status: SHIPPED | OUTPATIENT
Start: 2022-01-01 | End: 2022-01-01 | Stop reason: SDUPTHER

## 2022-01-01 RX ORDER — DILTIAZEM HYDROCHLORIDE 60 MG/1
60 TABLET, FILM COATED ORAL EVERY 8 HOURS
Qty: 90 TABLET | Refills: 0 | Status: SHIPPED | OUTPATIENT
Start: 2022-01-01 | End: 2022-01-01 | Stop reason: SDUPTHER

## 2022-01-01 RX ORDER — METOPROLOL TARTRATE 25 MG/1
12.5 TABLET, FILM COATED ORAL 2 TIMES DAILY
Qty: 60 TABLET | Refills: 6 | Status: ON HOLD | OUTPATIENT
Start: 2022-01-01 | End: 2022-01-01 | Stop reason: HOSPADM

## 2022-01-01 RX ADMIN — IOHEXOL 1000 ML: 12 SOLUTION ORAL at 09:06

## 2022-01-01 RX ADMIN — IOHEXOL 75 ML: 350 INJECTION, SOLUTION INTRAVENOUS at 09:06

## 2022-05-16 NOTE — TELEPHONE ENCOUNTER
No new care gaps identified.  [facility] Embedded Care Gaps. Reference number: [id number]. 5/16/2022   10:04:40 AM FABIANT

## 2022-05-17 NOTE — TELEPHONE ENCOUNTER
Refill Authorization Note   Leonarda Almazan  is requesting a refill authorization.  Brief Assessment and Rationale for Refill:  Approve     Medication Therapy Plan:       Medication Reconciliation Completed: No   Comments:     No Care Gaps recommended.     Note composed:8:23 AM 05/17/2022

## 2022-06-09 NOTE — TELEPHONE ENCOUNTER
Left message to call the office to schedule/reschedule appt. Recall number provided.  ----- Message from Christine Quintana Patient Care Assistant sent at 6/9/2022 10:28 AM CDT -----  Regarding: lilia  Contact: yoanna  Type: Needs Medical Advice  Who Called:  patient  Best Call Back Number: [phone]    Additional Information: please call patient to further discuss why today's appointment is canceled . Thanks

## 2022-07-08 NOTE — PROGRESS NOTES
"Subjective:       Patient ID: Leonarda Almazan is a 82 y.o. male.    Vitals:  height is 5' 7" (1.702 m) and weight is 42.6 kg (94 lb). His oral temperature is 98.1 °F (36.7 °C). His blood pressure is 78/50 (abnormal) and his pulse is 67. His respiration is 16 and oxygen saturation is 95%.     Chief Complaint: Cough    Patient is with wife on exam.    Cough  This is a new problem. The current episode started in the past 7 days. The problem has been unchanged. The problem occurs every few hours. The cough is non-productive. Associated symptoms include headaches, nasal congestion, postnasal drip, rhinorrhea and a sore throat. Pertinent negatives include no chest pain, chills, ear congestion, ear pain, eye redness, fever, heartburn, hemoptysis, myalgias, rash, shortness of breath, sweats, weight loss or wheezing. Nothing aggravates the symptoms. He has tried nothing for the symptoms.       Constitution: Negative for chills, sweating, fatigue and fever.   HENT: Positive for congestion, postnasal drip, sinus pressure and sore throat. Negative for ear pain, drooling, nosebleeds, foreign body in nose, sinus pain, trouble swallowing and voice change.    Neck: Negative for neck pain, neck stiffness, painful lymph nodes and neck swelling.   Cardiovascular: Negative for chest pain, leg swelling, palpitations, sob on exertion and passing out.   Eyes: Negative for eye discharge, eye itching, eye pain, eye redness and eyelid swelling.   Respiratory: Positive for cough. Negative for chest tightness, sputum production, bloody sputum, shortness of breath, stridor and wheezing.    Gastrointestinal: Negative for abdominal pain, abdominal bloating, nausea, vomiting, constipation, diarrhea and heartburn.   Genitourinary: Negative for urine decreased.   Musculoskeletal: Negative for joint pain, joint swelling, abnormal ROM of joint, pain with walking, muscle cramps and muscle ache.   Skin: Negative for rash and hives.   Allergic/Immunologic: " Negative for hives, itching and sneezing.   Neurological: Positive for headaches. Negative for dizziness, light-headedness, passing out, facial drooping, speech difficulty, loss of balance, altered mental status, loss of consciousness, numbness, tingling and seizures.   Hematologic/Lymphatic: Negative for swollen lymph nodes.   Psychiatric/Behavioral: Negative for altered mental status and nervous/anxious. The patient is not nervous/anxious.        Objective:      Physical Exam   Constitutional: He is oriented to person, place, and time. He appears well-developed. He is cooperative.  Non-toxic appearance. He does not appear ill. No distress.   HENT:   Head: Normocephalic and atraumatic.   Ears:   Right Ear: Hearing, tympanic membrane, external ear and ear canal normal.   Left Ear: Hearing, tympanic membrane, external ear and ear canal normal.   Nose: Mucosal edema and rhinorrhea present. No nasal deformity. No epistaxis. Right sinus exhibits no maxillary sinus tenderness and no frontal sinus tenderness. Left sinus exhibits no maxillary sinus tenderness and no frontal sinus tenderness.   Mouth/Throat: Uvula is midline and mucous membranes are normal. No trismus in the jaw. Normal dentition. No uvula swelling. Posterior oropharyngeal erythema and cobblestoning present. No oropharyngeal exudate, posterior oropharyngeal edema or tonsillar abscesses. No tonsillar exudate.   Eyes: Conjunctivae and lids are normal. No scleral icterus.   Neck: Trachea normal and phonation normal. Neck supple. No edema present. No erythema present. No neck rigidity present.   Cardiovascular: Normal rate, regular rhythm, normal heart sounds and normal pulses.   Pulmonary/Chest: Effort normal and breath sounds normal. No accessory muscle usage or stridor. No respiratory distress. He has no decreased breath sounds. He has no wheezes. He has no rhonchi. He has no rales.   Abdominal: Normal appearance.   Musculoskeletal: Normal range of motion.          General: No deformity. Normal range of motion.   Lymphadenopathy:     He has no cervical adenopathy.   Neurological: He is alert and oriented to person, place, and time. He has normal sensation. He exhibits normal muscle tone. Gait normal. Coordination normal.   Skin: Skin is warm, dry, intact, not diaphoretic, not pale and no rash. Capillary refill takes less than 2 seconds.   Psychiatric: His speech is normal and behavior is normal. Judgment and thought content normal.   Nursing note and vitals reviewed.        Results for orders placed or performed in visit on 07/08/22   POCT COVID-19 Rapid Screening   Result Value Ref Range    POC Rapid COVID Positive (A) Negative     Acceptable Yes        Assessment:       1. COVID-19 virus infection    2. Cough          Plan:     Discussed COVID-19 results with patient. CDC precautions given to patient. Advised close follow-up with PCP and/or Specialist for further evaluation as needed. Strict ER precautions given to patient as well. Patient aware, verbalized understanding and agreed with plan of care.    COVID-19 virus infection    Cough  -     POCT COVID-19 Rapid Screening    There are no Patient Instructions on file for this visit.

## 2022-08-03 NOTE — TELEPHONE ENCOUNTER
----- Message from Elijah Carroll sent at 8/3/2022  1:08 PM CDT -----  Contact: Patient  Type:  Patient Call          Who Called:Patient         Does the patient know what this is regarding?:Pt requesting a call back states he need to confirm that his appt is virtual on 8/8 ;please advise           Would the patient rather a call back or a response via [facility]? Call           Best Call Back Number:[phone]             Additional Information: Pt want the visit sooner than the 8/8 and virtual because he can't walk

## 2022-08-08 NOTE — PROGRESS NOTES
Subjective:       Patient ID: Leonarda Almazan is a 82 y.o. male.    Chief Complaint: No chief complaint on file.    Virtual visit for pain after eating. Had covid last month.  Pain from gas and bloating.  otc prilosec not helping      Abdominal Pain  This is a recurrent problem. The current episode started more than 1 month ago. The onset quality is gradual. The problem occurs constantly. The pain is located in the periumbilical region. The pain is at a severity of 10/10. The pain is severe. The quality of the pain is burning. The abdominal pain radiates to the periumbilical region. Associated symptoms include anorexia, belching and weight loss. Pertinent negatives include no fever. The pain is aggravated by being still. The pain is relieved by palpation. He has tried antacids for the symptoms. The treatment provided no relief. Prior diagnostic workup includes CT scan. His past medical history is significant for GERD and pancreatitis.      The patient location is:home in [place]  The chief complaint leading to consultation is: pain    Visit type: audiovisual    Face to Face time with patient: 9 minutes  12 minutes of total time spent on the encounter, which includes face to face time and non-face to face time preparing to see the patient (eg, review of tests), Obtaining and/or reviewing separately obtained history, Documenting clinical information in the electronic or other health record, Independently interpreting results (not separately reported) and communicating results to the patient/family/caregiver, or Care coordination (not separately reported).         Each patient to whom he or she provides medical services by telemedicine is:  (1) informed of the relationship between the physician and patient and the respective role of any other health care provider with respect to management of the patient; and (2) notified that he or she may decline to receive medical services by telemedicine and may withdraw from such care at any  time.    Notes:   Review of Systems   Constitutional: Positive for weight loss. Negative for chills and fever.   Respiratory: Negative for cough, chest tightness and shortness of breath.    Cardiovascular: Negative for chest pain, palpitations and leg swelling.   Gastrointestinal: Positive for abdominal pain and anorexia.   Endocrine: Negative for cold intolerance and heat intolerance.   Psychiatric/Behavioral: Negative for decreased concentration. The patient is not nervous/anxious.        Objective:      Physical Exam  Constitutional:       Appearance: Normal appearance.   Neurological:      Mental Status: He is alert.   Psychiatric:         Mood and Affect: Mood normal.         Behavior: Behavior normal.         Assessment:       1. Abdominal pain, unspecified abdominal location    2. Chronic bronchitis, unspecified chronic bronchitis type    3. Bronchiectasis without complication    4. Aortic atherosclerosis        Plan:       Abdominal pain, unspecified abdominal location    Chronic bronchitis, unspecified chronic bronchitis type    Bronchiectasis without complication    Aortic atherosclerosis    Other orders  -     sucralfate (CARAFATE) 1 gram tablet; Take 1 tablet (1 g total) by mouth 4 (four) times daily before meals and nightly.  Dispense: 120 tablet; Refill: 1        Had recent CT regarding pancreatic mass and likely not cause as stable   Prn med and monitor  In person visit with exam if not improved in 1-2 days  Copd stable with mac hx/infection  If not eating or drinking ED precautions given and he v/u    Follow up in about 1 month (around 9/8/2022), or if symptoms worsen or fail to improve.

## 2022-08-09 PROBLEM — U07.1 COVID-19: Status: ACTIVE | Noted: 2022-01-01

## 2022-08-09 PROBLEM — I48.91 ATRIAL FIBRILLATION WITH RVR: Status: ACTIVE | Noted: 2022-01-01

## 2022-08-09 PROBLEM — R74.8 ELEVATED LIVER ENZYMES: Status: ACTIVE | Noted: 2022-01-01

## 2022-08-09 PROBLEM — Z71.89 ACP (ADVANCE CARE PLANNING): Status: ACTIVE | Noted: 2022-01-01

## 2022-08-09 PROBLEM — J90 PLEURAL EFFUSION: Status: ACTIVE | Noted: 2022-01-01

## 2022-08-10 PROBLEM — E43 SEVERE MALNUTRITION: Status: ACTIVE | Noted: 2022-01-01

## 2022-08-10 PROBLEM — Z71.89 ACP (ADVANCE CARE PLANNING): Status: RESOLVED | Noted: 2022-01-01 | Resolved: 2022-01-01

## 2022-08-10 NOTE — TELEPHONE ENCOUNTER
----- Message from Giovany Chaudhary MD sent at 8/10/2022  8:55 AM CDT -----  Pt is currently in the hospital for COVID and a fib with RVR.  He follows with Dr Nayak for pancreatic mass.  He will need follow up with Dr Nayak on d/c.

## 2022-08-23 NOTE — TELEPHONE ENCOUNTER
----- Message from Ana Rodriguez sent at 8/23/2022  1:05 PM CDT -----  Contact: Arnold Danielle  Type: Patient Call Back         Who called: Arnold Danielle         What is the request in detail: calling to sched patient for hospital f/u; discharged 8/19; needs in 2 weeks         Can the clinic reply by [facility]? call         Would the patient rather a call back or a response via [facility]? call         Best call back number: [phone] - Patient         Additional Information: [phone] - Llois; please notify once sched           Thank You

## 2022-09-12 NOTE — PROGRESS NOTES
Subjective:      Patient ID: Leonarda Almazan is a 82 y.o. male.    Chief Complaint: Abdominal Pain    Patient is new to me.    Abdominal Pain  This is a recurrent problem. The current episode started more than 1 year ago. The onset quality is gradual. The problem occurs intermittently. The problem has been gradually improving. The pain is located in the periumbilical region. The pain is at a severity of 9/10. The pain is severe. The quality of the pain is burning. The abdominal pain radiates to the periumbilical region. Associated symptoms include anorexia, belching, frequency and weight loss. Nothing aggravates the pain. The pain is relieved by Passing flatus and sitting up. He has tried antacids and proton pump inhibitors for the symptoms. The treatment provided significant relief. Prior diagnostic workup includes CT scan, lower endoscopy and ultrasound. His past medical history is significant for GERD, pancreatitis and PUD. There is no history of abdominal surgery, colon cancer, Crohn's disease or gallstones.     Patient went to [facility] 8/9/2022 to 8/19/2022 with shortness of breath and had Afib with RVR.    Continuing intermittent periumbilical stomach pain.  Eating some.    On oxygen with nasal cannula using unknown liters.      Review of Systems   Constitutional:  Positive for weight loss.   Respiratory:  Positive for shortness of breath (baseline).    Cardiovascular:  Negative for chest pain.   Gastrointestinal:  Positive for abdominal pain and anorexia.   Genitourinary:  Positive for frequency.       Objective:   There were no vitals taken for this visit.    Physical Exam  Constitutional:       Appearance: Normal appearance.   HENT:      Head: Normocephalic and atraumatic.      Right Ear: External ear normal.      Left Ear: External ear normal.      Nose: Nose normal.   Eyes:      Conjunctiva/sclera: Conjunctivae normal.   Pulmonary:      Effort: No respiratory distress.      Comments: Has nasal cannula with  oxygen.  Neurological:      General: No focal deficit present.      Mental Status: He is alert and oriented to person, place, and time.   Psychiatric:         Mood and Affect: Mood normal.         Behavior: Behavior normal.     Assessment:      1. Shortness of breath    2. Cystic mass of pancreas       Plan:   1. Shortness of breath  Oxygen is helping.  - CBC Auto Differential; Future  - Comprehensive Metabolic Panel; Future    2. Cystic mass of pancreas  Needs follow up.  - Ambulatory referral/consult to Oncology; Future    Follow up with Dr. Blake in 2 months.  Patient agreed with plan and expressed understanding.  The patient location is:  Patient Home   The chief complaint leading to consultation is: abdominal pain.  Visit type: Virtual visit with synchronous audio and video  Total time spent with patient: 20 minutes  Each patient to whom he or she provides medical services by telemedicine is:  (1) informed of the relationship between the physician and patient and the respective role of any other health care provider with respect to management of the patient; and (2) notified that he or she may decline to receive medical services by telemedicine and may withdraw from such care at any time.

## 2022-09-15 NOTE — TELEPHONE ENCOUNTER
No new care gaps identified.  [facility] Embedded Care Gaps. Reference number: [id number]. 9/15/2022   10:55:01 AM FABIANT

## 2022-09-16 NOTE — TELEPHONE ENCOUNTER
Lazara spoke with the pt and the pt stated that he did not need to scheduled at this time.  ----- Message from Krystle Holman RN sent at 9/16/2022  1:36 PM CDT -----  Regarding: establish patient  Patient has seen Dr. Nayak in the past.  He needs a followup with possibly Dr. Chaudhary for cystic mass.    ~Allison

## 2022-10-10 NOTE — TELEPHONE ENCOUNTER
No new care gaps identified.  [facility] Embedded Care Gaps. Reference number: [id number]. 10/10/2022   3:01:22 PM CDT

## 2022-10-10 NOTE — PROGRESS NOTES
Subjective:    Patient ID:  Leonarda Almazan is a 82 y.o. male patient here for evaluation Shortness of Breath (Post hosp f/u - [facility] )      History of Present Illness:  Cardiology follow-up.  Recent hospitalization for shortness of breath respiratory insufficiency.  Patient underwent thoracentesis for left-sided pleural effusion.  Underlying bronchiectasis with pulmonary fibrosis.  Followed by Pulmonary.  History of valvular heart disease with preserved ejection fraction, moderate MR AI and TR.    Patient had atrial fibrillation during that hospitalization was placed on Cardizem for rate control and low-dose apixaban.  By office visit today he is clinically in atrial fibrillation with heart rates in the 90s to low 100s.    He claims dyspnea has improved.    Other concomitant medical problems include history of pancreatic mass, currently under investigation.  Cancer cannot be ruled out.    Service from severe protein caloric malnutrition, has gained some weight since his hospital discharge.    Needs pulmonary follow-up for additional direction.             Review of patient's allergies indicates:  No Known Allergies    Past Medical History:   Diagnosis Date    Anemia 2/19/2013    Bronchiectasis without acute exacerbation     Colon polyp     Cough     Elevated PSA     GERD (gastroesophageal reflux disease)     on herbal med    Herpes zoster w/ nervous system complication 2012    still has some pain, takes no meds    Hypothyroidism     Lung nodules     followed by Dr. Montoya    Post herpetic neuralgia      Past Surgical History:   Procedure Laterality Date    APPENDECTOMY      COLONOSCOPY  9/15/2008  [facility]    Two 1 mm polyps in the ascending colon.  HYPERPLASTIC POLYPS.      ESOPHAGOGASTRODUODENOSCOPY  10/16/2012  [facility]    Non-erosive esophageal reflux (NERD) disease???.   Gastric mucosal atrophy.  MILD CHRONIC GASTRITIS WITH INTESTINAL METAPLASIA.  Otherwise normal stomach and duodenum.  CLOtest negative.    EYE SURGERY       OS, not cataract, pt unsure of name    UPPER GASTROINTESTINAL ENDOSCOPY  9/15/2008  [facility]    Normal esophagus.   Symptoms probably due to NERD.  Slight gastric mucosal atrophy.   Normal examined duodenum.     Social History     Tobacco Use    Smoking status: Never    Smokeless tobacco: Never   Substance Use Topics    Alcohol use: No    Drug use: No        Review of Systems:    As noted in HPI in addition      REVIEW OF SYSTEMS  Review of Systems   Constitutional: Positive for malaise/fatigue. Negative for decreased appetite, diaphoresis, night sweats, weight gain and weight loss.   HENT:  Negative for nosebleeds and odynophagia.    Eyes:  Negative for double vision and photophobia.   Cardiovascular:  Positive for dyspnea on exertion and palpitations. Negative for chest pain, claudication, cyanosis, irregular heartbeat, leg swelling, near-syncope, orthopnea, paroxysmal nocturnal dyspnea and syncope.   Respiratory:  Positive for shortness of breath. Negative for cough, hemoptysis and wheezing.    Hematologic/Lymphatic: Negative for adenopathy.   Skin:  Negative for flushing, skin cancer and suspicious lesions.   Musculoskeletal:  Negative for gout, myalgias and neck pain.   Gastrointestinal:  Negative for abdominal pain, heartburn, hematemesis and hematochezia.   Genitourinary:  Negative for bladder incontinence, hesitancy and nocturia.   Neurological:  Negative for focal weakness, headaches, light-headedness and paresthesias.   Psychiatric/Behavioral:  Negative for memory loss and substance abuse.             Objective:        Vitals:    10/10/22 1333   BP: 101/60   Pulse: 95       Lab Results   Component Value Date    WBC 5.50 08/17/2022    HGB 12.9 (L) 08/17/2022    HCT 38.5 (L) 08/17/2022     (L) 08/17/2022    CHOL 161 11/08/2021    TRIG 81 11/08/2021    HDL 51 11/08/2021    ALT 45 08/16/2022    AST 39 08/16/2022     (L) 08/19/2022    K 3.8 08/19/2022    CL 87 (L) 08/19/2022    CREATININE 0.44 (L)  08/19/2022    BUN 19 08/19/2022    CO2 38 (H) 08/19/2022    TSH 3.240 08/09/2022    PSA 1.5 10/07/2016    INR 1.8 08/09/2022    HGBA1C 5.7 10/05/2015        ECHOCARDIOGRAM RESULTS  Results for orders placed during the hospital encounter of 08/09/22    Echo    Interpretation Summary  · Concentric remodeling and normal systolic function.  · The estimated ejection fraction is 55%.  · Normal left ventricular diastolic function.  · Normal right ventricular size with normal right ventricular systolic function.  · Moderate aortic regurgitation.  · Moderate mitral regurgitation.  · Mild tricuspid regurgitation.  · Normal central venous pressure (3 mmHg).  · The estimated PA systolic pressure is 38 mmHg.        CURRENT/PREVIOUS VISIT EKG  Results for orders placed or performed during the hospital encounter of 08/09/22   EKG 12-lead    Collection Time: 08/11/22  9:16 PM    Narrative    Test Reason : I49.9,    Vent. Rate : 102 BPM     Atrial Rate : 366 BPM     P-R Int : 000 ms          QRS Dur : 092 ms      QT Int : 370 ms       P-R-T Axes : 000 149 -55 degrees     QTc Int : 482 ms    Atrial flutter with variable A-V block  Right axis deviation  Low voltage QRS  Cannot rule out Anterior infarct (cited on or before 25-MAR-2021)  Abnormal ECG  When compared with ECG of 09-AUG-2022 08:46,  Atrial flutter has replaced Atrial fibrillation  Vent. rate has decreased BY  61 BPM  Confirmed by Jurgen Frey MD ([id number]) on 8/12/2022 10:49:16 AM    Referred By: [email or web address]   SELF           Confirmed By:Jurgen Frey MD     No valid procedures specified.   No results found for this or any previous visit.    No valid procedures specified.    PHYSICAL EXAM  CONSTITUTIONAL: Well built, well nourished in no apparent distress  NECK: no carotid bruit, no JVD  LUNGS:  Decreased breath sounds bilaterally.  Left base breath sounds are markedly decreased with dullness percussion.  CHEST WALL: no tenderness,  HEART: regular rate and rhythm, S1, S2 normal, no  murmur, click, rub or gallop   ABDOMEN: soft, non-tender; bowel sounds normal; no masses,  no organomegaly  EXTREMITIES: Extremities normal, no edema, no calf tenderness noted  NEURO: AAO X 3    I HAVE REVIEWED :    The vital signs, nurses notes, and all the pertinent radiology and labs.         Current Outpatient Medications   Medication Instructions    apixaban (ELIQUIS) 2.5 mg, Oral, 2 times daily    ascorbic acid (vitamin C) (VITAMIN C) 5,000 mg, Oral, Daily    b complex vitamins capsule 1 capsule, Oral, Daily    diltiaZEM (CARDIZEM) 60 mg, Oral, Every 8 hours    furosemide (LASIX) 20 mg, Oral, Daily    levalbuterol (XOPENEX) 0.63 mg, Nebulization, Every 4 hours PRN, Rescue    levothyroxine (SYNTHROID) 75 mcg, Oral, Before breakfast    megestroL (MEGACE) 40 mg, Oral, Daily    multivitamin (THERAGRAN) per tablet 1 tablet, Oral, Daily    sucralfate (CARAFATE) 1 g, Oral, Before meals & nightly    thiamine 100 mg, Oral, Daily          Assessment:   Bronchiectasis, pulmonary fibrosis.  Pleural effusion status post thoracentesis, medical therapy recommended for now.  Needs pulmonary follow-up.  Pleural effusion transudate in nature, negative cytology.  Was initially right-sided.  Pancreatic mass, no investigation.  Protein caloric malnutrition, weight is increased 2 lb since his discharge.        Plan:   Remains on low-dose apixaban 2.5 b.i.d. as well as Cardizem 60 every 8 hours for rate control.  Clinically patient remains in atrial fibrillation with heart rates in the 90s to low 100s.  May need additional pulmonary intervention.  Suspect recurrent pleural effusion.  Abnormal findings on auscultation and percussion left lung base.    Will perform outpatient Holter monitor in the interim between now on pulmonary evaluation, return to clinic to re-evaluate candidacy for ANNA, DC cardioversion.  Continue low-dose apixaban.  Continue Cardizem.      No follow-ups on file.

## 2022-10-18 NOTE — TELEPHONE ENCOUNTER
----- Message from Mohsen Mcdaniel MA sent at 10/18/2022  4:28 PM CDT -----  Mr. Almazan came in for Holter monitor hook up. Pt was short of breath holter showing A fib w/RVR with rates between 136 and 150. Pt states he has medications at home but not taking them. Pt unsure if he should take the medication. Please advise.

## 2022-10-19 NOTE — TELEPHONE ENCOUNTER
Spoke to pt and he insisted there is nothing wrong with his heart and wants to cancel his appt. Explained that his heart was beating too fast, pt states he has no pain and does not want to come in. Appt cancelled per pt request

## 2022-10-19 NOTE — TELEPHONE ENCOUNTER
Please advise: see previous message in this thread regarding pt's heart rate when he came to get Holter monitor last night  Spoke to pt this morning: he states his blood pressure is 108 and his heart rate is 98 (difficult to understand him due to accent); he says the reason he was short of breath last night is he had walked a long way to get up here and is not SOB this morning

## 2022-10-19 NOTE — TELEPHONE ENCOUNTER
----- Message from Sophy Zazueta sent at 10/19/2022 11:15 AM CDT -----  Regarding: call back  Type:  Patient Returning Call    Who Called: TASHIA WELLER [[id number]]    Who Left Message for Patient: Tiffanie    Does the patient know what this is regarding? Holter monitor and why does he have an appt scheduled     Best Call Back Number: [phone]

## 2022-10-31 NOTE — PROGRESS NOTES
Subjective:    Patient ID:  Leonarda Almazan is a 82 y.o. male patient here for evaluation Results (holter)      History of Present Illness:  Cardiology follow-up.  Atrial fibrillation in the setting of known bilateral pleural effusions, bronchiectasis, pulmonary fibrosis.  Patient Pulmonary Medicine.  Echo EF preserved with moderate MR AI TR.  Remains short of breath, weak with standing and ambulation.  Holter monitor with atrial fibrillation average heart rate 114, still tachy.  Patient remains intermittently on low-dose apixaban.  Some problems with compliance.   History of pancreatic mass, declines further evaluation.          Review of patient's allergies indicates:  No Known Allergies    Past Medical History:   Diagnosis Date    Anemia 2/19/2013    Bronchiectasis without acute exacerbation     Colon polyp     Cough     Elevated PSA     GERD (gastroesophageal reflux disease)     on herbal med    Herpes zoster w/ nervous system complication 2012    still has some pain, takes no meds    Hypothyroidism     Lung nodules     followed by Dr. Montoya    Post herpetic neuralgia      Past Surgical History:   Procedure Laterality Date    APPENDECTOMY      COLONOSCOPY  9/15/2008  [facility]    Two 1 mm polyps in the ascending colon.  HYPERPLASTIC POLYPS.      ESOPHAGOGASTRODUODENOSCOPY  10/16/2012  [facility]    Non-erosive esophageal reflux (NERD) disease???.   Gastric mucosal atrophy.  MILD CHRONIC GASTRITIS WITH INTESTINAL METAPLASIA.  Otherwise normal stomach and duodenum.  CLOtest negative.    EYE SURGERY      OS, not cataract, pt unsure of name    UPPER GASTROINTESTINAL ENDOSCOPY  9/15/2008  [facility]    Normal esophagus.   Symptoms probably due to NERD.  Slight gastric mucosal atrophy.   Normal examined duodenum.     Social History     Tobacco Use    Smoking status: Never    Smokeless tobacco: Never   Substance Use Topics    Alcohol use: No    Drug use: No        Review of Systems:    As noted in HPI in addition      REVIEW OF  SYSTEMS  Review of Systems   Constitutional: Positive for malaise/fatigue. Negative for decreased appetite, diaphoresis, night sweats, weight gain and weight loss.   HENT:  Negative for nosebleeds and odynophagia.    Eyes:  Negative for double vision and photophobia.   Cardiovascular:  Negative for chest pain, claudication, cyanosis, dyspnea on exertion, irregular heartbeat, leg swelling, near-syncope, orthopnea, palpitations, paroxysmal nocturnal dyspnea and syncope.   Respiratory:  Positive for shortness of breath. Negative for cough, hemoptysis and wheezing.    Hematologic/Lymphatic: Negative for adenopathy.   Skin:  Negative for flushing, skin cancer and suspicious lesions.   Musculoskeletal:  Negative for gout, myalgias and neck pain.   Gastrointestinal:  Negative for abdominal pain, heartburn, hematemesis and hematochezia.   Genitourinary:  Negative for bladder incontinence, hesitancy and nocturia.   Neurological:  Negative for focal weakness, headaches, light-headedness and paresthesias.   Psychiatric/Behavioral:  Negative for memory loss and substance abuse.             Objective:        Vitals:    10/31/22 1342   BP: (!) 141/81   Pulse: 75       Lab Results   Component Value Date    WBC 5.50 08/17/2022    HGB 12.9 (L) 08/17/2022    HCT 38.5 (L) 08/17/2022     (L) 08/17/2022    CHOL 161 11/08/2021    TRIG 81 11/08/2021    HDL 51 11/08/2021    ALT 45 08/16/2022    AST 39 08/16/2022     (L) 08/19/2022    K 3.8 08/19/2022    CL 87 (L) 08/19/2022    CREATININE 0.44 (L) 08/19/2022    BUN 19 08/19/2022    CO2 38 (H) 08/19/2022    TSH 3.240 08/09/2022    PSA 1.5 10/07/2016    INR 1.8 08/09/2022    HGBA1C 5.7 10/05/2015        ECHOCARDIOGRAM RESULTS  Results for orders placed during the hospital encounter of 08/09/22    Echo    Interpretation Summary  · Concentric remodeling and normal systolic function.  · The estimated ejection fraction is 55%.  · Normal left ventricular diastolic function.  · Normal  right ventricular size with normal right ventricular systolic function.  · Moderate aortic regurgitation.  · Moderate mitral regurgitation.  · Mild tricuspid regurgitation.  · Normal central venous pressure (3 mmHg).  · The estimated PA systolic pressure is 38 mmHg.        CURRENT/PREVIOUS VISIT EKG  Results for orders placed or performed during the hospital encounter of 08/09/22   EKG 12-lead    Collection Time: 08/11/22  9:16 PM    Narrative    Test Reason : I49.9,    Vent. Rate : 102 BPM     Atrial Rate : 366 BPM     P-R Int : 000 ms          QRS Dur : 092 ms      QT Int : 370 ms       P-R-T Axes : 000 149 -55 degrees     QTc Int : 482 ms    Atrial flutter with variable A-V block  Right axis deviation  Low voltage QRS  Cannot rule out Anterior infarct (cited on or before 25-MAR-2021)  Abnormal ECG  When compared with ECG of 09-AUG-2022 08:46,  Atrial flutter has replaced Atrial fibrillation  Vent. rate has decreased BY  61 BPM  Confirmed by Jurgen Frey MD ([id number]) on 8/12/2022 10:49:16 AM    Referred By: [email or web address]   SELF           Confirmed By:Jurgen Frey MD     No valid procedures specified.   No results found for this or any previous visit.    No valid procedures specified.    PHYSICAL EXAM  CONSTITUTIONAL: Well built, well nourished in no apparent distress  NECK: no carotid bruit, no JVD  LUNGS: CTA  CHEST WALL: no tenderness,  HEART: regular rate and rhythm, S1, S2 normal, no murmur, click, rub or gallop   ABDOMEN: soft, non-tender; bowel sounds normal; no masses,  no organomegaly  EXTREMITIES: Extremities normal, no edema, no calf tenderness noted  NEURO: AAO X 3    I HAVE REVIEWED :    The vital signs, nurses notes, and all the pertinent radiology and labs.         Current Outpatient Medications   Medication Instructions    ascorbic acid (vitamin C) (VITAMIN C) 5,000 mg, Oral, Daily    b complex vitamins capsule 1 capsule, Oral, Daily    levothyroxine (SYNTHROID) 75 mcg, Oral, Before breakfast    multivitamin  (THERAGRAN) per tablet 1 tablet, Oral, Daily    sucralfate (CARAFATE) 1 g, Oral, Before meals & nightly    thiamine 100 mg, Oral, Daily          Assessment:   Persistent atrial fibrillation with increased ventricular response  Protein caloric malnutrition.  Bilateral pleural effusions, bronchiectasis, fibrosis.  Pancreatic mass.        Plan:   Further pulmonary evaluation pending repeat chest x-rays in the near future.  Continue diltiazem 60 Q 8, add Lopressor 12.5 p.o. b.i.d..  Six week follow-up.  Continue evaluation for possible future ANNA DC cardioversion, pending pulmonary procedures.          No follow-ups on file.

## 2022-11-01 NOTE — TELEPHONE ENCOUNTER
----- Message from Eron Foley sent at 11/1/2022 10:38 AM CDT -----  Contact: pt at [phone]  Type: Needs Medical Advice  Who Called:  pt  Best Call Back Number: [phone]  Additional Information: pt is calling the office regarding a mediation whether he should continue taking it or not. Please call back and advise.

## 2022-11-04 NOTE — TELEPHONE ENCOUNTER
Spoke with Dr. Antunez this am and clarified if patient is to remain on Eliquis. Advised that patient continue Eliquis.  Informed patient to remain on Eliquis. Patient verbalized understanding.

## 2022-11-08 NOTE — TELEPHONE ENCOUNTER
----- Message from Sophy Zazueta sent at 11/8/2022  2:00 PM CST -----  Regarding: pt call back  Name of Who is Calling: TASHIA WELLER [[id number]]      What is the request in detail: Pt is needing to speak to the dr. He said he feels like he is dying. He does not eat. He can not stand. He is very weak. He stated he wants the dr to know that he wants to die. He does not want any help or to be saved he just wants to die. Transferred to triage nurse. No nurse available.       Can the clinic reply by [facility]: no       What Number [facility] Back [facility]: [phone]

## 2022-11-08 NOTE — TELEPHONE ENCOUNTER
"OOC RN   Patient wants to speak to the doctor "  Patient states he wants to die."    Pain, cannot eat, nausea, cannot eat.  Sitting in chair,  I want to shoot myself.  I want help to die."  Spouse sitting next to him.   Suffering from pain, everywhere.  Can't stand up bc of pain.  Has SI,   Suicide,   Denies plan,  Called suicide hotline.  Called the help line.   Wants to tell Dr. Blake he wants to die.  Denies HI,  Cannot eat.   Denies having gun.  Wife on the phone Kareem MTAHEWS    answered phone. Care advise is to call 911,  calling now.   I hear the bell.    I hear the paramedics at the door talking to wife and patient.  Care advise is to call 911    Reason for Disposition   Patient is threatening suicide now    Additional Information   Negative: Patient attempted suicide    Protocols used: Suicide [id number]    "

## 2022-11-08 NOTE — TELEPHONE ENCOUNTER
[place] 911 dispatched to [place] (dispatcher #[id number]), confirmed address with triage nurse speaking to pt's wife: [address] [address]    Pt reporting SI per triage nurse (Katie Yan), no weapons, no active attempt.     @ 1417, en route to pt's home.   Reason for Disposition   Sounds like a life-threatening emergency to the triager    Protocols used: Suicide [id number]

## 2022-11-09 PROBLEM — K86.89 PANCREATIC MASS: Status: ACTIVE | Noted: 2019-12-16

## 2022-11-09 PROBLEM — Z75.8 DISCHARGE PLANNING ISSUES: Status: ACTIVE | Noted: 2022-01-01

## 2022-11-09 PROBLEM — R78.81 BACTEREMIA: Status: ACTIVE | Noted: 2022-01-01

## 2022-11-09 PROBLEM — R62.7 FAILURE TO THRIVE IN ADULT: Status: ACTIVE | Noted: 2022-01-01

## 2023-06-26 NOTE — TELEPHONE ENCOUNTER
Patient was in lobby asking to speak with someone.  He has left the building.  Labs have been scheduled.  lmovm to return call to clinic.    Arslan Willett PA-C, Reji Jarrett PA-C, Dr. Alonzo